# Patient Record
Sex: MALE | Race: BLACK OR AFRICAN AMERICAN | NOT HISPANIC OR LATINO | Employment: UNEMPLOYED | URBAN - METROPOLITAN AREA
[De-identification: names, ages, dates, MRNs, and addresses within clinical notes are randomized per-mention and may not be internally consistent; named-entity substitution may affect disease eponyms.]

---

## 2018-05-14 ENCOUNTER — HISTORICAL (OUTPATIENT)
Dept: ADMINISTRATIVE | Facility: HOSPITAL | Age: 33
End: 2018-05-14

## 2018-06-04 ENCOUNTER — HISTORICAL (OUTPATIENT)
Dept: ADMINISTRATIVE | Facility: HOSPITAL | Age: 33
End: 2018-06-04

## 2018-07-16 ENCOUNTER — HISTORICAL (OUTPATIENT)
Dept: ADMINISTRATIVE | Facility: HOSPITAL | Age: 33
End: 2018-07-16

## 2018-11-14 ENCOUNTER — HISTORICAL (OUTPATIENT)
Dept: ADMINISTRATIVE | Facility: HOSPITAL | Age: 33
End: 2018-11-14

## 2018-11-14 LAB
ABS NEUT (OLG): 2.56 X10(3)/MCL (ref 2.1–9.2)
ALBUMIN SERPL-MCNC: 4.1 GM/DL (ref 3.4–5)
ALBUMIN/GLOB SERPL: 1 RATIO (ref 1–2)
ALP SERPL-CCNC: 98 UNIT/L (ref 45–117)
ALT SERPL-CCNC: 26 UNIT/L (ref 12–78)
AST SERPL-CCNC: 23 UNIT/L (ref 15–37)
BASOPHILS # BLD AUTO: 0.08 X10(3)/MCL
BASOPHILS NFR BLD AUTO: 2 %
BILIRUB SERPL-MCNC: 0.5 MG/DL (ref 0.2–1)
BILIRUBIN DIRECT+TOT PNL SERPL-MCNC: 0.2 MG/DL
BILIRUBIN DIRECT+TOT PNL SERPL-MCNC: 0.3 MG/DL
BUN SERPL-MCNC: 9 MG/DL (ref 7–18)
CALCIUM SERPL-MCNC: 8.8 MG/DL (ref 8.5–10.1)
CHLORIDE SERPL-SCNC: 102 MMOL/L (ref 98–107)
CHOLEST SERPL-MCNC: 122 MG/DL
CHOLEST/HDLC SERPL: 1.8 {RATIO} (ref 0–5)
CO2 SERPL-SCNC: 33 MMOL/L (ref 21–32)
CREAT SERPL-MCNC: 0.9 MG/DL (ref 0.6–1.3)
EOSINOPHIL # BLD AUTO: 0.45 X10(3)/MCL
EOSINOPHIL NFR BLD AUTO: 8 %
ERYTHROCYTE [DISTWIDTH] IN BLOOD BY AUTOMATED COUNT: 11.8 % (ref 11.5–14.5)
EST. AVERAGE GLUCOSE BLD GHB EST-MCNC: 77 MG/DL
GLOBULIN SER-MCNC: 3.6 GM/ML (ref 2.3–3.5)
GLUCOSE SERPL-MCNC: 69 MG/DL (ref 74–106)
HBA1C MFR BLD: 4.3 % (ref 4.2–6.3)
HCT VFR BLD AUTO: 44.3 % (ref 40–51)
HDLC SERPL-MCNC: 68 MG/DL
HGB BLD-MCNC: 14.2 GM/DL (ref 13.5–17.5)
IMM GRANULOCYTES # BLD AUTO: 0.01 10*3/UL
IMM GRANULOCYTES NFR BLD AUTO: 0 %
LDLC SERPL CALC-MCNC: 40 MG/DL (ref 0–130)
LYMPHOCYTES # BLD AUTO: 1.86 X10(3)/MCL
LYMPHOCYTES NFR BLD AUTO: 34 % (ref 13–40)
MCH RBC QN AUTO: 30 PG (ref 26–34)
MCHC RBC AUTO-ENTMCNC: 32.1 GM/DL (ref 31–37)
MCV RBC AUTO: 93.5 FL (ref 80–100)
MONOCYTES # BLD AUTO: 0.52 X10(3)/MCL
MONOCYTES NFR BLD AUTO: 10 % (ref 4–12)
NEUTROPHILS # BLD AUTO: 2.56 X10(3)/MCL
NEUTROPHILS NFR BLD AUTO: 47 X10(3)/MCL
PLATELET # BLD AUTO: 110 X10(3)/MCL (ref 130–400)
PMV BLD AUTO: 12.5 FL (ref 7.4–10.4)
POTASSIUM SERPL-SCNC: 3.3 MMOL/L (ref 3.5–5.1)
PROT SERPL-MCNC: 7.7 GM/DL (ref 6.4–8.2)
RBC # BLD AUTO: 4.74 X10(6)/MCL (ref 4.5–5.9)
SODIUM SERPL-SCNC: 140 MMOL/L (ref 136–145)
TRIGL SERPL-MCNC: 69 MG/DL
TSH SERPL-ACNC: 2.9 MIU/L (ref 0.36–3.74)
VLDLC SERPL CALC-MCNC: 14 MG/DL
WBC # SPEC AUTO: 5.5 X10(3)/MCL (ref 4.5–11)

## 2018-11-19 ENCOUNTER — HISTORICAL (OUTPATIENT)
Dept: ADMINISTRATIVE | Facility: HOSPITAL | Age: 33
End: 2018-11-19

## 2018-12-04 ENCOUNTER — HISTORICAL (OUTPATIENT)
Dept: ADMINISTRATIVE | Facility: HOSPITAL | Age: 33
End: 2018-12-04

## 2018-12-18 ENCOUNTER — HISTORICAL (OUTPATIENT)
Dept: RADIOLOGY | Facility: HOSPITAL | Age: 33
End: 2018-12-18

## 2018-12-18 ENCOUNTER — HISTORICAL (OUTPATIENT)
Dept: ADMINISTRATIVE | Facility: HOSPITAL | Age: 33
End: 2018-12-18

## 2019-03-27 ENCOUNTER — HISTORICAL (OUTPATIENT)
Dept: ADMINISTRATIVE | Facility: HOSPITAL | Age: 34
End: 2019-03-27

## 2020-01-20 ENCOUNTER — HISTORICAL (OUTPATIENT)
Dept: ADMINISTRATIVE | Facility: HOSPITAL | Age: 35
End: 2020-01-20

## 2020-01-20 LAB
APTT PPP: 33.3 SECOND(S) (ref 23.3–37)
ERYTHROCYTE [DISTWIDTH] IN BLOOD BY AUTOMATED COUNT: 12.1 % (ref 11.5–14.5)
HCT VFR BLD AUTO: 48.1 % (ref 40–51)
HGB BLD-MCNC: 14.3 GM/DL (ref 13.5–17.5)
INR PPP: 0.94 (ref 0.9–1.2)
MCH RBC QN AUTO: 29.2 PG (ref 26–34)
MCHC RBC AUTO-ENTMCNC: 29.7 GM/DL (ref 31–37)
MCV RBC AUTO: 98.2 FL (ref 80–100)
PLATELET # BLD AUTO: 117 X10(3)/MCL (ref 130–400)
PMV BLD AUTO: 12.6 FL (ref 7.4–10.4)
PROTHROMBIN TIME: 12.5 SECOND(S) (ref 11.9–14.4)
RBC # BLD AUTO: 4.9 X10(6)/MCL (ref 4.5–5.9)
WBC # SPEC AUTO: 5.1 X10(3)/MCL (ref 4.5–11)

## 2020-01-21 ENCOUNTER — HISTORICAL (OUTPATIENT)
Dept: SURGERY | Facility: HOSPITAL | Age: 35
End: 2020-01-21

## 2021-05-25 ENCOUNTER — HISTORICAL (OUTPATIENT)
Dept: ADMINISTRATIVE | Facility: HOSPITAL | Age: 36
End: 2021-05-25

## 2021-05-25 LAB — SARS-COV-2 RNA RESP QL NAA+PROBE: NOT DETECTED

## 2021-05-27 ENCOUNTER — HISTORICAL (OUTPATIENT)
Dept: SURGERY | Facility: HOSPITAL | Age: 36
End: 2021-05-27

## 2022-04-30 NOTE — H&P
Patient:   Davis Thomas            MRN: [id number]            FIN: [id number]               Age:   34 years     Sex:  Male     :  1985   Associated Diagnoses:   None   Author:   Sandy Roman MD      H&P      HPI: PT here for PKP OS. Denies changes in health or medications since the patient was last seen in clinic.     ROS: No chest pain, no trouble breathing.     General NAD  HENT atraumatic  Eyes: See eye note   Neck: no limitation  Respiratory: no distress on room air  Cardiovascular: warm and well perfused   Musculoskeletal: ambulates well     Assessment/Plan  1. Keratoconus OS   - After extensive discussion of R/B/A, informed consent was signed in clinic and reviewed today  - Plan for PKP OS today

## 2022-04-30 NOTE — OP NOTE
Patient:   Davis Thomas            MRN: [id number]            FIN: [id number]               Age:   36 years     Sex:  Male     :  1985   Associated Diagnoses:   None   Author:   Bernabe Blanco MD        Patient: ROBEL Thomas  Primary Surgeon: BRENDEN Blanco  Assistant: AVRIL Hernandez  Preop Dx:  1. corneal graft scarring with astigmatism  Postop Dx: same  Procedure: anterior segment revision with sarina blade keratotomy  Complications: none  EBL: none  Anesthesia: topical / mac  Detail:    After informed consent was obtained, all risks, benefits and alternatives were explained in full detail.  The patient  was brought into the operative suite and placed supine on the operative table.  Attention was turned to the left eye  where it was prepped and draped in sterile ophthalmic fashion and a speculum was placed.    The sarina blade was brought onto the field and dialed to 700 micron depth.  The 90 degree axis was identified  and a 50 degree paired incision at 12:00 and 6:00 was created.  The wound created was examined and found  to be approximately 90% depth just inside the original graft host junction without signs of micro or macro perforation.    The patient tolerated the procedure well without complications and was taken to recovery in stable condition.    I performed the entire procedure personally.    CPT: 97604

## 2022-05-04 NOTE — HISTORICAL [facility] CERNER
This is a historical note converted from Tai. Formatting and pictures may have been removed.  Please reference Tai for original formatting and attached multimedia. HPI: PT here for?ASTIGMATIC KERATOTOMY?OS. Denies changes in health or medications since the patient was last seen in clinic.  ?  ROS: Negative x 10  ?  ?   General NAP  HENT atraumatic  Eyes:?S/P DALK?OS  Neck: nontender, no masses or thyromegaly  Respiratory: no distress on room air  Cardiovascular: regular rate  Gastrointestinal: no hepatomegaly?  Lymphatics: no lymphadenopathy  Musculoskeletal: wnl  ?  ?   Assessment/Plan  1. ASTIGMATISM LEFT EYE  - S/P DALK STILL WITH SIGNIFICANT ASTIGMATISM  - After extensive discussion of R/B/A, informed consent was?already?signed in clinic.  - All questions were answered.  - Proceed to OR for AK OS TODAY  ?

## 2022-06-23 ENCOUNTER — OFFICE VISIT (OUTPATIENT)
Dept: OPHTHALMOLOGY | Facility: CLINIC | Age: 37
End: 2022-06-23
Payer: MEDICAID

## 2022-06-23 VITALS — BODY MASS INDEX: 20.09 KG/M2 | HEIGHT: 74 IN | WEIGHT: 156.5 LBS

## 2022-06-23 DIAGNOSIS — Z94.7 STATUS POST CORNEAL TRANSPLANT: Primary | ICD-10-CM

## 2022-06-23 PROCEDURE — 99213 OFFICE O/P EST LOW 20 MIN: CPT | Mod: PBBFAC,PO | Performed by: STUDENT IN AN ORGANIZED HEALTH CARE EDUCATION/TRAINING PROGRAM

## 2022-06-23 RX ORDER — PREDNISOLONE ACETATE 10 MG/ML
SUSPENSION/ DROPS OPHTHALMIC
COMMUNITY
Start: 2022-03-23 | End: 2022-06-23

## 2022-06-23 RX ORDER — PREDNISOLONE ACETATE 10 MG/ML
1 SUSPENSION/ DROPS OPHTHALMIC DAILY
Qty: 10 ML | Refills: 0 | Status: SHIPPED | OUTPATIENT
Start: 2022-06-23 | End: 2022-10-21 | Stop reason: SDUPTHER

## 2022-06-23 NOTE — PROGRESS NOTES
Prior studies:  Mrx 12/23/21:  OD: -3.00 +5.00 x 060 2/50  OS: -2.75 +2.25 x 180 20/100    Gabriel 12/23/21  OD: Flat sinK 39.07, steep sim K 43.18, astig 4.12 D at 49  OS: Flat simK 45.78, steep simK 46.51, astig 0.73 x 137  ______________________________________________________    OCT RNFL (6/23/22):  OD: 99 green STI, white N  OS: 102 green 360  No thinning OU    HVF (6/23/22):  OD: 0/11 FL, 0% FP, 4% FN, scattered superior and inferior defects  OS: 0/12 FL, 5% FP, 16% FN, superotemporal defect  Nonglaucomatous field changes    K gabriel (6/23/22):  OD: Steep K: 45.27D, Flat K 40.36D, astig 4.91D @ 68  OS: Steep K: 48.73D, Steep K 45.21D, astig 3.52 @110    Assessment/Plan:  1. Keratoconus OS   - s/p DALK OS 1/2020  - s/p AK for astigmatism 5/21  - continue PF daily  - Wear protective glasses during the day at all times  - Worsening astig, discussed that pt would likely not benefit from repeat AK at this point. Patient has thick cornea and would likely benefit from PRK however unable to afford as noted below  - Will give Mrx today    2. Keratoconus OD  - S/p PKP (11/6/14)  - S/p AK 10/7/19  - Cornea has clear central PKP graft  - continue PF once weekly; drops refilled  - Patient indicated that there were barriers to using contact lenses, including affordability and difficulty placing them  - Per Dr. Blanco can consider PRK to reduce astigmatism. Spoke with Dr. Alonso's office, cost would be $250 for standard. Pt unable to afford at this time, Mrx given tdoay    3. Open angle with borderline findings, OU  - (+) Fhx  - Gonio 10/2018: open to  OU, light pigmentation  - Large nerves, 0.6  - Baseline HVF 10/2018 unreliable, likely due to irregular astigmatism (will follow with OCT)  - OCT RNFL all green  - HVF without glaucaomatous changes  - IOP wnl    4. MGD OU  - Start WC/LS/ATs 4-6 times/day (handout given)    5. Marfan's Syndrome  - seen by PCP with workup initiated  - echo done  - no obvious lens subluxation  on exam    DW Dr. Blanco

## 2022-10-21 DIAGNOSIS — Z94.7 STATUS POST CORNEAL TRANSPLANT: ICD-10-CM

## 2022-10-21 RX ORDER — PREDNISOLONE ACETATE 10 MG/ML
1 SUSPENSION/ DROPS OPHTHALMIC DAILY
Qty: 10 ML | Refills: 3 | Status: SHIPPED | OUTPATIENT
Start: 2022-10-21 | End: 2023-10-21

## 2022-10-21 NOTE — TELEPHONE ENCOUNTER
----- Message from Sabrina Connelly sent at 10/21/2022 12:16 PM CDT -----  Regarding: Needs refill  Contact: [phone]  Patient needs a refill for his eye drops.    [facility] - [facility]      TYVM :)

## 2022-12-08 ENCOUNTER — OFFICE VISIT (OUTPATIENT)
Dept: OPHTHALMOLOGY | Facility: CLINIC | Age: 37
End: 2022-12-08
Payer: MEDICAID

## 2022-12-08 VITALS — HEIGHT: 74 IN | WEIGHT: 156.5 LBS | BODY MASS INDEX: 20.09 KG/M2

## 2022-12-08 DIAGNOSIS — Z94.7 STATUS POST CORNEAL TRANSPLANT: Primary | ICD-10-CM

## 2022-12-08 PROCEDURE — 92025 CPTRIZED CORNEAL TOPOGRAPHY: CPT | Mod: PBBFAC,PO | Performed by: STUDENT IN AN ORGANIZED HEALTH CARE EDUCATION/TRAINING PROGRAM

## 2022-12-08 PROCEDURE — 99212 OFFICE O/P EST SF 10 MIN: CPT | Mod: PBBFAC,PO | Performed by: STUDENT IN AN ORGANIZED HEALTH CARE EDUCATION/TRAINING PROGRAM

## 2022-12-08 RX ORDER — OMEPRAZOLE 40 MG/1
40 CAPSULE, DELAYED RELEASE ORAL
COMMUNITY
Start: 2021-09-27

## 2022-12-08 NOTE — PROGRESS NOTES
Prior studies:  Mrx 12/23/21:  OD: -3.00 +5.00 x 060 2/50  OS: -2.75 +2.25 x 180 20/100    Gabriel 12/23/21  OD: Flat sinK 39.07, steep sim K 43.18, astig 4.12 D at 49  OS: Flat simK 45.78, steep simK 46.51, astig 0.73 x 137  ______________________________________________________    OCT RNFL (6/23/22):  OD: 99 green STI, white N  OS: 102 green 360  No thinning OU    HVF (6/23/22):  OD: 0/11 FL, 0% FP, 4% FN, scattered superior and inferior defects  OS: 0/12 FL, 5% FP, 16% FN, superotemporal defect  Nonglaucomatous field changes    K gabriel (6/23/22):  OD: Steep K: 45.27D, Flat K 40.36D, astig 4.91D @ 68  OS: Steep K: 48.73D, Steep K 45.21D, astig 3.52 @110    K gabriel 12/8/22   OD: 45.33//40.35 5.01 at 63   OS: 48.39//44.43 3.96 at 103     Assessment/Plan:  1. Keratoconus OS   - s/p DALK OS 1/2020  - s/p AK for astigmatism 5/21  - Wear protective glasses during the day at all times  - Worsening astig, discussed that pt would likely not benefit from repeat AK at this point. Patient has thick cornea and would likely benefit from PRK however unable to afford as noted below  - Mrx given 12/2022  - Decrease PF to qweek     2. Keratoconus OD  - S/p PKP (11/6/14)  - S/p AK 10/7/19  - Cornea has clear central PKP graft  - continue PF once weekly; drops refilled  - Patient indicated that there were barriers to using contact lenses, including affordability and difficulty placing them  - Per Dr. Blanco can consider PRK to reduce astigmatism. Spoke with Dr. Alonso's office, cost would be $250 for standard. Pt unable to afford at this time, Mrx given 6/2022    3. Open angle with borderline findings, OU  - (+) Fhx  - Gonio 10/2018: open to  OU, light pigmentation  - Large nerves, 0.6  - Baseline HVF 10/2018 unreliable, likely due to irregular astigmatism (will follow with OCT)  - OCT RNFL all green  - HVF without glaucaomatous changes  - IOP wnl    4. MGD OU  - Start WC/LS/ATs 4-6 times/day (handout given)    5. Marfan's  Syndrome  - seen by PCP with workup initiated  - echo done  - no obvious lens subluxation on exam        RTC 1 year Francis Rios

## 2023-09-07 ENCOUNTER — OFFICE VISIT (OUTPATIENT)
Dept: INTERNAL MEDICINE | Facility: CLINIC | Age: 38
End: 2023-09-07
Payer: MEDICAID

## 2023-09-07 VITALS
RESPIRATION RATE: 18 BRPM | HEART RATE: 71 BPM | DIASTOLIC BLOOD PRESSURE: 83 MMHG | WEIGHT: 151.19 LBS | TEMPERATURE: 98 F | SYSTOLIC BLOOD PRESSURE: 115 MMHG | BODY MASS INDEX: 19.4 KG/M2 | HEIGHT: 74 IN

## 2023-09-07 DIAGNOSIS — Z13.1 SCREENING FOR DIABETES MELLITUS: ICD-10-CM

## 2023-09-07 DIAGNOSIS — Z76.89 ENCOUNTER TO ESTABLISH CARE: ICD-10-CM

## 2023-09-07 DIAGNOSIS — Z00.00 WELLNESS EXAMINATION: Primary | ICD-10-CM

## 2023-09-07 DIAGNOSIS — Z13.29 SCREENING FOR THYROID DISORDER: ICD-10-CM

## 2023-09-07 DIAGNOSIS — Z13.220 SCREENING CHOLESTEROL LEVEL: ICD-10-CM

## 2023-09-07 DIAGNOSIS — Z94.7 STATUS POST CORNEAL TRANSPLANT: Chronic | ICD-10-CM

## 2023-09-07 DIAGNOSIS — Z11.3 ROUTINE SCREENING FOR STI (SEXUALLY TRANSMITTED INFECTION): ICD-10-CM

## 2023-09-07 PROBLEM — Q87.40 MARFAN'S SYNDROME: Chronic | Status: ACTIVE | Noted: 2023-09-07

## 2023-09-07 PROBLEM — Q87.40 MARFAN'S SYNDROME: Status: ACTIVE | Noted: 2023-09-07

## 2023-09-07 PROCEDURE — 1159F PR MEDICATION LIST DOCUMENTED IN MEDICAL RECORD: ICD-10-PCS | Mod: CPTII,,, | Performed by: NURSE PRACTITIONER

## 2023-09-07 PROCEDURE — 3074F PR MOST RECENT SYSTOLIC BLOOD PRESSURE < 130 MM HG: ICD-10-PCS | Mod: CPTII,,, | Performed by: NURSE PRACTITIONER

## 2023-09-07 PROCEDURE — 3079F PR MOST RECENT DIASTOLIC BLOOD PRESSURE 80-89 MM HG: ICD-10-PCS | Mod: CPTII,,, | Performed by: NURSE PRACTITIONER

## 2023-09-07 PROCEDURE — 1159F MED LIST DOCD IN RCRD: CPT | Mod: CPTII,,, | Performed by: NURSE PRACTITIONER

## 2023-09-07 PROCEDURE — 3079F DIAST BP 80-89 MM HG: CPT | Mod: CPTII,,, | Performed by: NURSE PRACTITIONER

## 2023-09-07 PROCEDURE — 3008F PR BODY MASS INDEX (BMI) DOCUMENTED: ICD-10-PCS | Mod: CPTII,,, | Performed by: NURSE PRACTITIONER

## 2023-09-07 PROCEDURE — 99214 OFFICE O/P EST MOD 30 MIN: CPT | Mod: PBBFAC | Performed by: NURSE PRACTITIONER

## 2023-09-07 PROCEDURE — 99213 PR OFFICE/OUTPT VISIT, EST, LEVL III, 20-29 MIN: ICD-10-PCS | Mod: S$PBB,,, | Performed by: NURSE PRACTITIONER

## 2023-09-07 PROCEDURE — 1160F RVW MEDS BY RX/DR IN RCRD: CPT | Mod: CPTII,,, | Performed by: NURSE PRACTITIONER

## 2023-09-07 PROCEDURE — 3008F BODY MASS INDEX DOCD: CPT | Mod: CPTII,,, | Performed by: NURSE PRACTITIONER

## 2023-09-07 PROCEDURE — 99213 OFFICE O/P EST LOW 20 MIN: CPT | Mod: S$PBB,,, | Performed by: NURSE PRACTITIONER

## 2023-09-07 PROCEDURE — 1160F PR REVIEW ALL MEDS BY PRESCRIBER/CLIN PHARMACIST DOCUMENTED: ICD-10-PCS | Mod: CPTII,,, | Performed by: NURSE PRACTITIONER

## 2023-09-07 PROCEDURE — 3074F SYST BP LT 130 MM HG: CPT | Mod: CPTII,,, | Performed by: NURSE PRACTITIONER

## 2023-09-07 NOTE — PROGRESS NOTES
Jena Bah, JACQUELINE   [facility]  [address] [facility] [address]      PATIENT NAME: Davis Thomas  : 1985  DATE: 23  MRN: [id number]      Reason for Visit / Chief Complaint: Establish Care (Not fasting, refused vaccine )       History of Present Illness / Problem Focused Workflow     Davis Thomas is a 38 y.o. Black or  male presents to the clinic to establish primary care. PMH Marfan's syndrome, glaucoma, s/p bilateral corneal transplants. Followed by [facility] optho clinic.     Today, pt presents to establish primary care. Has never had PCP. States has never seen cardio. Reports eats constantly and denies any GI or cardiac problems. Is not fasting today. Declines vaccines today. Denies chest pain, shortness of breath, cough, fever, headache, dizziness, weakness, abdominal pain, nausea, vomiting, diarrhea, constipation, dysuria, depression, anxiety, SI/HI.    Review of Systems     Review of Systems     See HPI for details    Constitutional: Denies Change in appetite. Denies Chills. Denies Fever. Denies Night sweats.  Eye: Denies Blurred vision. Denies Discharge. Denies Eye pain.  ENT: Denies Decreased hearing. Denies Sore throat. Denies Swollen glands.  Respiratory: Denies Cough. Denies Shortness of breath. Denies Shortness of breath with exertion. Denies Wheezing.  Cardiovascular: Denies Chest pain at rest. Denies Chest pain with exertion. Denies Irregular heartbeat. Denies Palpitations. Denies Edema.  Gastrointestinal: Denies Abdominal pain. Denies Diarrhea. Denies Nausea. Denies Vomiting. Denies Hematemesis or Hematochezia.  Genitourinary: Denies Dysuria. Denies Urinary frequency. Denies Urinary urgency. Denies Blood in urine.  Endocrine: Denies Cold intolerance. Denies Excessive thirst. Denies Heat intolerance. Denies Weight loss. Denies Weight gain.  Musculoskeletal: Denies Painful joints. Denies  "Weakness.  Integumentary: Denies Rash. Denies Itching. Denies Dry skin.  Neurologic: Denies Dizziness. Denies Fainting. Denies Headache.  Psychiatric: Denies Depression. Denies Anxiety. Denies Suicidal/Homicidal ideations.    All Other ROS: Negative except as stated in HPI.     Medical / Surgical / Social / Family History       ----------------------------  Keratoconus     Past Surgical History:   Procedure Laterality Date    CORNEAL TRANSPLANT Bilateral        Social History     Socioeconomic History    Marital status: Unknown   Tobacco Use    Smoking status: Never     Passive exposure: Never    Smokeless tobacco: Never   Substance and Sexual Activity    Alcohol use: Yes     Comment: beer on occ    Drug use: Not Currently     Types: Marijuana     Social Determinants of Health     Transportation Needs: No Transportation Needs (9/7/2023)    PRAPARE - Transportation     Lack of Transportation (Medical): No     Lack of Transportation (Non-Medical): No   Housing Stability: Low Risk  (9/7/2023)    Housing Stability Vital Sign     Unable to Pay for Housing in the Last Year: No     Number of Places Lived in the Last Year: 1     Unstable Housing in the Last Year: No        History reviewed. No pertinent family history.     Medications and Allergies     Medications  Current Outpatient Medications   Medication Instructions    omeprazole (PRILOSEC) 40 mg, Oral    prednisoLONE acetate (PRED FORTE) 1 % DrpS 1 drop, Left Eye, Daily         Allergies  Review of patient's allergies indicates:  No Known Allergies    Physical Examination     /83 (BP Location: Right arm, Patient Position: Sitting, BP Method: Medium (Automatic))   Pulse 71   Temp 98.4 °F (36.9 °C) (Oral)   Resp 18   Ht 6' 2.02" (1.88 m)   Wt 68.6 kg (151 lb 3.2 oz)   BMI 19.40 kg/m²     Physical Exam  Constitutional:       Comments: Thin, with long arms, fingers and legs         General: Alert and oriented, No acute distress.  Head: Normocephalic, " Atraumatic.  Eye: Pupils are equal, round and reactive to light, Extraocular movements are intact, Sclera non-icteric.  Ears/Nose/Throat: Normal, Mucosa moist,Clear.  Neck/Thyroid: Supple, Non-tender, No carotid bruit, No lymphadenopathy, No JVD, Full range of motion.  Respiratory: Clear to auscultation bilaterally; No wheezes, rales or rhonchi,Non-labored respirations, Symmetrical chest wall expansion.  Cardiovascular: Regular rate and rhythm, S1/S2 normal, No murmurs, rubs or gallops.  Gastrointestinal: Soft, Non-tender, Non-distended, Normal bowel sounds, No palpable organomegaly.  Musculoskeletal: Normal range of motion.  Integumentary: Warm, Dry, Intact, No suspicious lesions or rashes.  Extremities: No clubbing, cyanosis or edema  Neurologic: No focal deficits, Cranial Nerves II-XII are grossly intact, Motor strength normal upper and lower extremities, Sensory exam intact.  Psychiatric: Normal interaction, Coherent speech, Appropriate affect       Results     Lab Results   Component Value Date    WBC 5.9 09/27/2021    HGB 15.8 09/27/2021    HCT 48.1 09/27/2021     09/27/2021    CHOL 122 11/14/2018    TRIG 69 11/14/2018    ALT 13 09/27/2021    AST 19 09/27/2021     09/27/2021    K 3.3 (L) 09/27/2021    CREATININE 0.84 09/27/2021    BUN 4.5 (L) 09/27/2021    CO2 26 09/27/2021    TSH 2.900 11/14/2018    INR 0.94 01/20/2020    HGBA1C 4.3 11/14/2018         Assessment and Plan (including Health Maintenance)     Plan:     1. Encounter to establish care  Labs ordered; pt is not fasting.    2. Status post corneal transplant  Keep optho appts as scheduled.  Report vision changes immediately.    3. Routine screening for STI (sexually transmitted infection)  - Hepatitis Panel, Acute; Future  - Chlamydia/GC, PCR  - HIV 1/2 Ag/Ab (4th Gen); Future  - SYPHILIS ANTIBODY (WITH REFLEX RPR); Future      Problem List Items Addressed This Visit          Ophtho    Status post corneal transplant (Chronic)       Other     Encounter to establish care     Other Visit Diagnoses       Wellness examination    -  Primary    Relevant Orders    CBC Auto Differential    Comprehensive Metabolic Panel    Urinalysis, Reflex to Urine Culture    Screening for diabetes mellitus        Relevant Orders    Hemoglobin A1C    Screening cholesterol level        Relevant Orders    Lipid Panel    Screening for thyroid disorder        Relevant Orders    TSH    Routine screening for STI (sexually transmitted infection)        Relevant Orders    Hepatitis Panel, Acute    Chlamydia/GC, PCR    HIV 1/2 Ag/Ab (4th Gen)    SYPHILIS ANTIBODY (WITH REFLEX RPR)              Health Maintenance Due   Topic Date Due    Hepatitis C Screening  Never done    COVID-19 Vaccine (1) Never done    HIV Screening  Never done    TETANUS VACCINE  Never done    Influenza Vaccine (1) Never done       Follow up in about 4 weeks (around 10/5/2023) for Wellness, Lab Results.        Signature:  JACQUELINE Kaplan  [facility]  [address] [facility] [address]

## 2023-11-02 RX ORDER — PREDNISOLONE ACETATE 10 MG/ML
1 SUSPENSION/ DROPS OPHTHALMIC WEEKLY
COMMUNITY
End: 2023-11-02 | Stop reason: SDUPTHER

## 2023-11-02 RX ORDER — PREDNISOLONE ACETATE 10 MG/ML
1 SUSPENSION/ DROPS OPHTHALMIC WEEKLY
Qty: 5 ML | Refills: 5 | Status: SHIPPED | OUTPATIENT
Start: 2023-11-02 | End: 2023-12-07 | Stop reason: SDUPTHER

## 2023-11-02 NOTE — TELEPHONE ENCOUNTER
----- Message from Teresa Adler sent at 11/2/2023  8:22 AM CDT -----  Regarding: refill  Contact: Belinda Joyner mom called to request a refill on white top. Confirm contact and pharmacy on file.    Thanks

## 2023-11-22 LAB
C TRACH DNA SPEC QL NAA+PROBE: NOT DETECTED
N GONORRHOEA DNA SPEC QL NAA+PROBE: NOT DETECTED
SOURCE (OHS): NORMAL

## 2023-12-07 ENCOUNTER — OFFICE VISIT (OUTPATIENT)
Dept: OPHTHALMOLOGY | Facility: CLINIC | Age: 38
End: 2023-12-07
Payer: MEDICAID

## 2023-12-07 VITALS — BODY MASS INDEX: 19.41 KG/M2 | WEIGHT: 151.25 LBS | HEIGHT: 74 IN

## 2023-12-07 DIAGNOSIS — Z94.7 STATUS POST CORNEAL TRANSPLANT: Primary | ICD-10-CM

## 2023-12-07 DIAGNOSIS — H18.603 KERATOCONUS OF BOTH EYES: ICD-10-CM

## 2023-12-07 PROCEDURE — 92025 CPTRIZED CORNEAL TOPOGRAPHY: CPT | Mod: PBBFAC,PN | Performed by: OPHTHALMOLOGY

## 2023-12-07 PROCEDURE — 99212 OFFICE O/P EST SF 10 MIN: CPT | Mod: PBBFAC,PN

## 2023-12-07 PROCEDURE — 92025 CPTRIZED CORNEAL TOPOGRAPHY: CPT | Mod: PBBFAC,PN

## 2023-12-07 RX ORDER — PREDNISOLONE ACETATE 10 MG/ML
1 SUSPENSION/ DROPS OPHTHALMIC WEEKLY
Qty: 20 ML | Refills: 5 | Status: SHIPPED | OUTPATIENT
Start: 2023-12-07

## 2023-12-07 NOTE — PROGRESS NOTES
"HPI     Keratoconus     Additional comments: Gabriel  S/p PKP OD (11/6/14)  S/p AK OD 10/7/19   s/p DALK OS 1/2020  s/p AK OS for astigmatism 5/21      Blurred Vision     Additional comments: Patient states that vision " has a lil whack to it".   He is trying to find a place that accepts his insurance so that       Medication Refill     Additional comments: Using PF OU qd right know. Pharmacy in chart   [facility]      Comments    Keratoconus, blurry vision, med refill     OCT RNFL (6/23/22):  OD: 99 green STI, white N  OS: 102 green 360  No thinning OU    HVF (6/23/22):  OD: 0/11 FL, 0% FP, 4% FN, scattered superior and inferior defects  OS: 0/12 FL, 5% FP, 16% FN, superotemporal defect  Nonglaucomatous field changes    Gabriel 12/23/21  OD: Flat sinK 39.07, steep sim K 43.18, astig 4.12 D at 49  OS: Flat simK 45.78, steep simK 46.51, astig 0.73 x 137    6/23/22  OD: Steep K: 45.27D, Flat K 40.36D, astig 4.91D @ 68  OS: Steep K: 48.73D, Steep K 45.21D, astig 3.52 @110    12/8/22   OD: 45.33//40.35 5.01 at 63   OS: 48.39//44.43 3.96 at 103     12/7/23  OD: 45.36//40.61 astig 4.76D at 63  OS: 49.5//44.39 astig 5.11D at 103    Assessment/Plan:  1. Keratoconus OS   - s/p DALK OS 1/2020  - s/p AK for astigmatism 5/21  - Wear protective glasses during the day at all times  - Worsening astig, discussed that pt would likely not benefit from repeat AK at this point. Patient has thick cornea and would likely benefit from PRK however unable to afford as noted below  - Mrx given 12/2023, patient never got glasses due to unable to afford them  - 12/7/23: using PF daily still, decrease to once weekly, drops refilled; states vision is getting worse. Topography showed worsening astigmatism. Discussed with patient hard contact lens vs PRK, patient unable to afford now but is waiting for disability to go through in 2 months. RTC in 3-4 months for Dr. Blanco     2. Keratoconus OD  - S/p PKP (11/6/14)  - S/p AK 10/7/19  - Cornea has clear " central PKP graft  - continue PF once weekly; drops refilled  - Patient indicated that there were barriers to using contact lenses, including affordability and difficulty placing them  - Per Dr. Blanco can consider PRK to reduce astigmatism. Spoke with Dr. Alonso's office, cost would be $250 for standard.    3. Open angle with borderline findings, OU  - (+) Fhx  - Gonio 10/2018: open to  OU, light pigmentation  - Large nerves, 0.6  - Baseline HVF 10/2018 unreliable, likely due to irregular astigmatism (will follow with OCT)  - OCT RNFL all green  - HVF without glaucaomatous changes  - IOP wnl    4. MGD OU  - Start WC/LS/ATs 4-6 times/day (handout given)    5. Marfan's Syndrome  - seen by PCP with workup initiated  - echo done  - no obvious lens subluxation on exam    RTC 3-4 months Dr. Blanco

## 2024-03-14 ENCOUNTER — OFFICE VISIT (OUTPATIENT)
Dept: OPHTHALMOLOGY | Facility: CLINIC | Age: 39
End: 2024-03-14
Payer: MEDICAID

## 2024-03-14 VITALS — BODY MASS INDEX: 19.41 KG/M2 | WEIGHT: 151.25 LBS | HEIGHT: 74 IN

## 2024-03-14 DIAGNOSIS — H18.603 KERATOCONUS OF BOTH EYES: Primary | ICD-10-CM

## 2024-03-14 DIAGNOSIS — Z94.7 STATUS POST CORNEAL TRANSPLANT: ICD-10-CM

## 2024-03-14 PROCEDURE — 99212 OFFICE O/P EST SF 10 MIN: CPT | Mod: PBBFAC,PN | Performed by: STUDENT IN AN ORGANIZED HEALTH CARE EDUCATION/TRAINING PROGRAM

## 2024-03-14 NOTE — PROGRESS NOTES
HPI     Keratoconus     Additional comments: K check    s/p DALK OS 1/2020, s/p AK OS for astigmatism 5/21, S/p PKP OD (11/6/14),   and S/p AK OD 10/7/19                Open angle with borderline findings     Additional comments: IOP check            Comments    Keratoconus, Open angle with borderline findings          Last edited by Venecia Pak MA on 3/14/2024  2:19 PM.            Assessment /Plan     For exam results, see Encounter Report.    Keratoconus of both eyes    Status post corneal transplant               Gabriel 12/23/21  OD: Flat sinK 39.07, steep sim K 43.18, astig 4.12 D at 49  OS: Flat simK 45.78, steep simK 46.51, astig 0.73 x 137    6/23/22  OD: Steep K: 45.27D, Flat K 40.36D, astig 4.91D @ 68  OS: Steep K: 48.73D, Steep K 45.21D, astig 3.52 @110    12/8/22   OD: 45.33//40.35 5.01 at 63   OS: 48.39//44.43 3.96 at 103     12/7/23  OD: 45.36//40.61 astig 4.76D at 63  OS: 49.5//44.39 astig 5.11D at 103    Assessment/Plan:      1. Keratoconus OS   - s/p DALK OS 1/2020  - s/p AK for astigmatism 5/21  - Wear protective glasses during the day at all times  - Worsening astig, discussed that pt would likely not benefit from repeat AK at this point. Patient has thick cornea and would likely benefit from PRK however unable to afford as noted below  - Mrx given 12/2023, patient never got glasses due to unable to afford them  - 12/7/23: using PF daily still, decrease to once weekly, drops refilled; states vision is getting worse. Topography showed worsening astigmatism. Discussed with patient hard contact lens vs PRK, patient unable to afford now but is waiting for disability (still).  RTC PRN for PRK logistics if/when interested    2. Keratoconus OD  - S/p PKP (11/6/14)  - S/p AK 10/7/19  - Cornea has clear central PKP graft  - continue PF once weekly; drops refilled  - Patient indicated that there were barriers to using contact lenses, including affordability and difficulty placing them  - Per Dr. Blanco  can consider PRK to reduce astigmatism. Spoke with Dr. Alonso's office, cost would be $250 for standard.  - 3/14/24: no change on exam, grafts look great.  Can continue once weekly PF and be seen when he is ready for PRK    3. Open angle with borderline findings, OU  - (+) Fhx  - Gonio 10/2018: open to  OU, light pigmentation  - Large nerves, 0.6  - Baseline HVF 10/2018 unreliable, likely due to irregular astigmatism (will follow with OCT)  - OCT RNFL all green  - HVF without glaucaomatous changes  - IOP wnl    4. MGD OU  - Start WC/LS/ATs 4-6 times/day (handout given)    5. Marfan's Syndrome  - seen by PCP with workup initiated  - echo done  - no obvious lens subluxation on exam    RTC 1 year sooner PRN

## 2024-03-23 ENCOUNTER — HOSPITAL ENCOUNTER (EMERGENCY)
Facility: HOSPITAL | Age: 39
Discharge: HOME | End: 2024-03-24
Attending: SPECIALIST
Payer: COMMERCIAL

## 2024-03-23 VITALS
DIASTOLIC BLOOD PRESSURE: 92 MMHG | TEMPERATURE: 97 F | SYSTOLIC BLOOD PRESSURE: 126 MMHG | RESPIRATION RATE: 20 BRPM | HEART RATE: 73 BPM | OXYGEN SATURATION: 98 %

## 2024-03-23 DIAGNOSIS — S90.111A CONTUSION OF RIGHT GREAT TOE WITHOUT DAMAGE TO NAIL, INITIAL ENCOUNTER: Primary | ICD-10-CM

## 2024-03-23 DIAGNOSIS — Z00.8 MEDICAL CLEARANCE FOR INCARCERATION: ICD-10-CM

## 2024-03-23 PROCEDURE — 99282 EMERGENCY DEPT VISIT SF MDM: CPT
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
## 2024-04-10 PROCEDURE — 96374 THER/PROPH/DIAG INJ IV PUSH: CPT

## 2024-04-10 PROCEDURE — 99285 EMERGENCY DEPT VISIT HI MDM: CPT | Mod: 25

## 2024-04-11 ENCOUNTER — HOSPITAL ENCOUNTER (INPATIENT)
Facility: HOSPITAL | Age: 39
LOS: 1 days | Discharge: HOME | DRG: 117 | End: 2024-04-11
Attending: EMERGENCY MEDICINE | Admitting: SURGERY
Payer: MEDICAID

## 2024-04-11 ENCOUNTER — ANESTHESIA (OUTPATIENT)
Dept: SURGERY | Facility: HOSPITAL | Age: 39
DRG: 117 | End: 2024-04-11
Payer: MEDICAID

## 2024-04-11 ENCOUNTER — ANESTHESIA EVENT (OUTPATIENT)
Dept: SURGERY | Facility: HOSPITAL | Age: 39
DRG: 117 | End: 2024-04-11
Payer: MEDICAID

## 2024-04-11 VITALS
SYSTOLIC BLOOD PRESSURE: 121 MMHG | HEART RATE: 91 BPM | DIASTOLIC BLOOD PRESSURE: 64 MMHG | WEIGHT: 160 LBS | HEIGHT: 70 IN | BODY MASS INDEX: 22.9 KG/M2 | RESPIRATION RATE: 17 BRPM | OXYGEN SATURATION: 96 % | TEMPERATURE: 99 F

## 2024-04-11 DIAGNOSIS — S02.2XXA CLOSED FRACTURE OF NASAL BONE, INITIAL ENCOUNTER: ICD-10-CM

## 2024-04-11 DIAGNOSIS — S05.32XA RUPTURE OF GLOBE OF EYE FOLLOWING BLUNT TRAUMA, LEFT, INITIAL ENCOUNTER: Primary | ICD-10-CM
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
## 2024-04-11 DIAGNOSIS — T14.90XA TRAUMA: ICD-10-CM

## 2024-04-11 PROBLEM — R07.9 CHEST PAIN: Status: ACTIVE | Noted: 2024-04-11

## 2024-04-11 PROBLEM — S05.30XA RUPTURE OF GLOBE: Status: ACTIVE | Noted: 2024-04-11

## 2024-04-11 LAB
ABORH RETYPE: NORMAL
ALBUMIN SERPL-MCNC: 4.3 G/DL (ref 3.5–5)
ALBUMIN/GLOB SERPL: 1.5 RATIO (ref 1.1–2)
ALP SERPL-CCNC: 90 UNIT/L (ref 40–150)
ALT SERPL-CCNC: 20 UNIT/L (ref 0–55)
APTT PPP: 30.2 SECONDS (ref 23.2–33.7)
AST SERPL-CCNC: 25 UNIT/L (ref 5–34)
BASOPHILS # BLD AUTO: 0.05 X10(3)/MCL
BASOPHILS NFR BLD AUTO: 0.5 %
BILIRUB SERPL-MCNC: 0.6 MG/DL
BUN SERPL-MCNC: 6.9 MG/DL (ref 8.9–20.6)
CALCIUM SERPL-MCNC: 8.8 MG/DL (ref 8.4–10.2)
CHLORIDE SERPL-SCNC: 105 MMOL/L (ref 98–107)
CO2 SERPL-SCNC: 19 MMOL/L (ref 22–29)
CREAT SERPL-MCNC: 0.92 MG/DL (ref 0.73–1.18)
CRP SERPL-MCNC: 7.3 MG/L
EOSINOPHIL # BLD AUTO: 0.02 X10(3)/MCL (ref 0–0.9)
EOSINOPHIL NFR BLD AUTO: 0.2 %
ERYTHROCYTE [DISTWIDTH] IN BLOOD BY AUTOMATED COUNT: 11.7 % (ref 11.5–17)
ETHANOL SERPL-MCNC: 200 MG/DL
GFR SERPLBLD CREATININE-BSD FMLA CKD-EPI: >60 MLS/MIN/1.73/M2
GLOBULIN SER-MCNC: 2.9 GM/DL (ref 2.4–3.5)
GLUCOSE SERPL-MCNC: 109 MG/DL (ref 74–100)
GROUP & RH: NORMAL
HCT VFR BLD AUTO: 42.7 % (ref 42–52)
HGB BLD-MCNC: 14 G/DL (ref 14–18)
IMM GRANULOCYTES # BLD AUTO: 0.04 X10(3)/MCL (ref 0–0.04)
IMM GRANULOCYTES NFR BLD AUTO: 0.4 %
INDIRECT COOMBS: NORMAL
INR PPP: 1.1
LACTATE SERPL-SCNC: 2.6 MMOL/L (ref 0.5–2.2)
LYMPHOCYTES # BLD AUTO: 1.01 X10(3)/MCL (ref 0.6–4.6)
LYMPHOCYTES NFR BLD AUTO: 9.4 %
MCH RBC QN AUTO: 30.1 PG (ref 27–31)
MCHC RBC AUTO-ENTMCNC: 32.8 G/DL (ref 33–36)
MCV RBC AUTO: 91.8 FL (ref 80–94)
MONOCYTES # BLD AUTO: 0.51 X10(3)/MCL (ref 0.1–1.3)
MONOCYTES NFR BLD AUTO: 4.7 %
NEUTROPHILS # BLD AUTO: 9.14 X10(3)/MCL (ref 2.1–9.2)
NEUTROPHILS NFR BLD AUTO: 84.8 %
NRBC BLD AUTO-RTO: 0 %
OHS QRS DURATION: 90 MS
OHS QTC CALCULATION: 495 MS
PLATELET # BLD AUTO: 138 X10(3)/MCL (ref 130–400)
PLATELETS.RETICULATED NFR BLD AUTO: 8.2 % (ref 0.9–11.2)
PMV BLD AUTO: 11.4 FL (ref 7.4–10.4)
POTASSIUM SERPL-SCNC: 3.5 MMOL/L (ref 3.5–5.1)
PREALB SERPL-MCNC: 27.8 MG/DL (ref 18–45)
PROT SERPL-MCNC: 7.2 GM/DL (ref 6.4–8.3)
PROTHROMBIN TIME: 13.9 SECONDS (ref 12.5–14.5)
RBC # BLD AUTO: 4.65 X10(6)/MCL (ref 4.7–6.1)
SODIUM SERPL-SCNC: 137 MMOL/L (ref 136–145)
SPECIMEN OUTDATE: NORMAL
TROPONIN I SERPL-MCNC: <0.01 NG/ML (ref 0–0.04)
TROPONIN I SERPL-MCNC: <0.01 NG/ML (ref 0–0.04)
WBC # SPEC AUTO: 10.77 X10(3)/MCL (ref 4.5–11.5)

## 2024-04-11 PROCEDURE — 37000009 HC ANESTHESIA EA ADD 15 MINS: Performed by: OPHTHALMOLOGY

## 2024-04-11 PROCEDURE — 25000003 PHARM REV CODE 250

## 2024-04-11 PROCEDURE — 25000003 PHARM REV CODE 250: Performed by: EMERGENCY MEDICINE

## 2024-04-11 PROCEDURE — 90715 TDAP VACCINE 7 YRS/> IM: CPT | Performed by: EMERGENCY MEDICINE

## 2024-04-11 PROCEDURE — 86140 C-REACTIVE PROTEIN: CPT | Performed by: NURSE PRACTITIONER

## 2024-04-11 PROCEDURE — 83605 ASSAY OF LACTIC ACID: CPT | Performed by: NURSE PRACTITIONER

## 2024-04-11 PROCEDURE — 93010 ELECTROCARDIOGRAM REPORT: CPT | Mod: ,,, | Performed by: INTERNAL MEDICINE

## 2024-04-11 PROCEDURE — 25000003 PHARM REV CODE 250: Performed by: NURSE ANESTHETIST, CERTIFIED REGISTERED

## 2024-04-11 PROCEDURE — 99222 1ST HOSP IP/OBS MODERATE 55: CPT | Mod: ,,, | Performed by: SURGERY

## 2024-04-11 PROCEDURE — 63600175 PHARM REV CODE 636 W HCPCS: Performed by: EMERGENCY MEDICINE

## 2024-04-11 PROCEDURE — 71000033 HC RECOVERY, INTIAL HOUR: Performed by: OPHTHALMOLOGY

## 2024-04-11 PROCEDURE — 85025 COMPLETE CBC W/AUTO DIFF WBC: CPT | Performed by: EMERGENCY MEDICINE

## 2024-04-11 PROCEDURE — 71000016 HC POSTOP RECOV ADDL HR: Performed by: OPHTHALMOLOGY

## 2024-04-11 PROCEDURE — D9220A PRA ANESTHESIA: Mod: ANES,,, | Performed by: ANESTHESIOLOGY

## 2024-04-11 PROCEDURE — 63600175 PHARM REV CODE 636 W HCPCS: Performed by: NURSE ANESTHETIST, CERTIFIED REGISTERED

## 2024-04-11 PROCEDURE — 84134 ASSAY OF PREALBUMIN: CPT | Performed by: NURSE PRACTITIONER

## 2024-04-11 PROCEDURE — 3E0234Z INTRODUCTION OF SERUM, TOXOID AND VACCINE INTO MUSCLE, PERCUTANEOUS APPROACH: ICD-10-PCS | Performed by: SURGERY

## 2024-04-11 PROCEDURE — 93005 ELECTROCARDIOGRAM TRACING: CPT

## 2024-04-11 PROCEDURE — 08Q9XZZ REPAIR LEFT CORNEA, EXTERNAL APPROACH: ICD-10-PCS | Performed by: OPHTHALMOLOGY

## 2024-04-11 PROCEDURE — 86901 BLOOD TYPING SEROLOGIC RH(D): CPT | Performed by: EMERGENCY MEDICINE

## 2024-04-11 PROCEDURE — 90471 IMMUNIZATION ADMIN: CPT | Performed by: EMERGENCY MEDICINE

## 2024-04-11 PROCEDURE — 71000015 HC POSTOP RECOV 1ST HR: Performed by: OPHTHALMOLOGY

## 2024-04-11 PROCEDURE — 11000001 HC ACUTE MED/SURG PRIVATE ROOM

## 2024-04-11 PROCEDURE — 36000706: Performed by: OPHTHALMOLOGY

## 2024-04-11 PROCEDURE — 84484 ASSAY OF TROPONIN QUANT: CPT | Performed by: NURSE PRACTITIONER

## 2024-04-11 PROCEDURE — 25000003 PHARM REV CODE 250: Performed by: OPHTHALMOLOGY

## 2024-04-11 PROCEDURE — 25000003 PHARM REV CODE 250: Performed by: NURSE PRACTITIONER

## 2024-04-11 PROCEDURE — 85730 THROMBOPLASTIN TIME PARTIAL: CPT | Performed by: EMERGENCY MEDICINE

## 2024-04-11 PROCEDURE — D9220A PRA ANESTHESIA: Mod: CRNA,,, | Performed by: NURSE ANESTHETIST, CERTIFIED REGISTERED

## 2024-04-11 PROCEDURE — 25500020 PHARM REV CODE 255: Performed by: SURGERY

## 2024-04-11 PROCEDURE — 63600175 PHARM REV CODE 636 W HCPCS: Performed by: OPHTHALMOLOGY

## 2024-04-11 PROCEDURE — 99223 1ST HOSP IP/OBS HIGH 75: CPT | Mod: ,,, | Performed by: SURGERY

## 2024-04-11 PROCEDURE — 36000707: Performed by: OPHTHALMOLOGY

## 2024-04-11 PROCEDURE — 85610 PROTHROMBIN TIME: CPT | Performed by: EMERGENCY MEDICINE

## 2024-04-11 PROCEDURE — 63600175 PHARM REV CODE 636 W HCPCS: Performed by: NURSE PRACTITIONER

## 2024-04-11 PROCEDURE — 80053 COMPREHEN METABOLIC PANEL: CPT | Performed by: EMERGENCY MEDICINE

## 2024-04-11 PROCEDURE — 82077 ASSAY SPEC XCP UR&BREATH IA: CPT | Performed by: EMERGENCY MEDICINE

## 2024-04-11 PROCEDURE — A4216 STERILE WATER/SALINE, 10 ML: HCPCS | Performed by: OPHTHALMOLOGY

## 2024-04-11 PROCEDURE — 37000008 HC ANESTHESIA 1ST 15 MINUTES: Performed by: OPHTHALMOLOGY

## 2024-04-11 RX ORDER — OXYCODONE HYDROCHLORIDE 5 MG/1
5 TABLET ORAL EVERY 6 HOURS PRN
Qty: 15 TABLET | Refills: 0 | Status: SHIPPED | OUTPATIENT
Start: 2024-04-11 | End: 2024-06-06 | Stop reason: ALTCHOICE

## 2024-04-11 RX ORDER — SODIUM CITRATE AND CITRIC ACID MONOHYDRATE 334; 500 MG/5ML; MG/5ML
30 SOLUTION ORAL
Status: CANCELLED | OUTPATIENT
Start: 2024-04-11

## 2024-04-11 RX ORDER — ROCURONIUM BROMIDE 10 MG/ML
INJECTION, SOLUTION INTRAVENOUS
Status: DISCONTINUED | OUTPATIENT
Start: 2024-04-11 | End: 2024-04-11

## 2024-04-11 RX ORDER — ENOXAPARIN SODIUM 100 MG/ML
40 INJECTION SUBCUTANEOUS EVERY 12 HOURS
Status: DISCONTINUED | OUTPATIENT
Start: 2024-04-11 | End: 2024-04-11 | Stop reason: HOSPADM

## 2024-04-11 RX ORDER — PROPOFOL 10 MG/ML
VIAL (ML) INTRAVENOUS
Status: DISCONTINUED | OUTPATIENT
Start: 2024-04-11 | End: 2024-04-11

## 2024-04-11 RX ORDER — PHENYLEPHRINE HCL IN 0.9% NACL 1 MG/10 ML
SYRINGE (ML) INTRAVENOUS
Status: DISCONTINUED | OUTPATIENT
Start: 2024-04-11 | End: 2024-04-11

## 2024-04-11 RX ORDER — FENTANYL CITRATE 50 UG/ML
INJECTION, SOLUTION INTRAMUSCULAR; INTRAVENOUS
Status: DISCONTINUED | OUTPATIENT
Start: 2024-04-11 | End: 2024-04-11

## 2024-04-11 RX ORDER — MEPERIDINE HYDROCHLORIDE 25 MG/ML
12.5 INJECTION INTRAMUSCULAR; INTRAVENOUS; SUBCUTANEOUS ONCE AS NEEDED
Status: CANCELLED | OUTPATIENT
Start: 2024-04-11 | End: 2024-04-12

## 2024-04-11 RX ORDER — LIDOCAINE HYDROCHLORIDE 20 MG/ML
INJECTION, SOLUTION EPIDURAL; INFILTRATION; INTRACAUDAL; PERINEURAL
Status: DISCONTINUED | OUTPATIENT
Start: 2024-04-11 | End: 2024-04-11

## 2024-04-11 RX ORDER — DEXAMETHASONE SODIUM PHOSPHATE 4 MG/ML
INJECTION, SOLUTION INTRA-ARTICULAR; INTRALESIONAL; INTRAMUSCULAR; INTRAVENOUS; SOFT TISSUE
Status: DISCONTINUED | OUTPATIENT
Start: 2024-04-11 | End: 2024-04-11

## 2024-04-11 RX ORDER — HYDROMORPHONE HYDROCHLORIDE 2 MG/ML
0.2 INJECTION, SOLUTION INTRAMUSCULAR; INTRAVENOUS; SUBCUTANEOUS EVERY 5 MIN PRN
Status: CANCELLED | OUTPATIENT
Start: 2024-04-11

## 2024-04-11 RX ORDER — POLYETHYLENE GLYCOL 3350 17 G/17G
17 POWDER, FOR SOLUTION ORAL 2 TIMES DAILY
Status: DISCONTINUED | OUTPATIENT
Start: 2024-04-11 | End: 2024-04-11 | Stop reason: HOSPADM

## 2024-04-11 RX ORDER — GABAPENTIN 300 MG/1
300 CAPSULE ORAL 3 TIMES DAILY
Status: DISCONTINUED | OUTPATIENT
Start: 2024-04-11 | End: 2024-04-11 | Stop reason: HOSPADM

## 2024-04-11 RX ORDER — LIDOCAINE HYDROCHLORIDE 10 MG/ML
1 INJECTION, SOLUTION EPIDURAL; INFILTRATION; INTRACAUDAL; PERINEURAL ONCE
Status: CANCELLED | OUTPATIENT
Start: 2024-04-11 | End: 2024-04-11

## 2024-04-11 RX ORDER — CEFAZOLIN SODIUM 1 G/3ML
INJECTION, POWDER, FOR SOLUTION INTRAMUSCULAR; INTRAVENOUS
Status: DISCONTINUED | OUTPATIENT
Start: 2024-04-11 | End: 2024-04-11 | Stop reason: HOSPADM

## 2024-04-11 RX ORDER — TALC
6 POWDER (GRAM) TOPICAL NIGHTLY PRN
Status: DISCONTINUED | OUTPATIENT
Start: 2024-04-11 | End: 2024-04-11 | Stop reason: HOSPADM

## 2024-04-11 RX ORDER — SODIUM CHLORIDE, SODIUM GLUCONATE, SODIUM ACETATE, POTASSIUM CHLORIDE AND MAGNESIUM CHLORIDE 30; 37; 368; 526; 502 MG/100ML; MG/100ML; MG/100ML; MG/100ML; MG/100ML
INJECTION, SOLUTION INTRAVENOUS CONTINUOUS
Status: CANCELLED | OUTPATIENT
Start: 2024-04-11 | End: 2024-05-11

## 2024-04-11 RX ORDER — ACETAMINOPHEN 325 MG/1
650 TABLET ORAL EVERY 4 HOURS
Status: DISCONTINUED | OUTPATIENT
Start: 2024-04-11 | End: 2024-04-11 | Stop reason: HOSPADM

## 2024-04-11 RX ORDER — OXYCODONE HYDROCHLORIDE 5 MG/1
5 TABLET ORAL EVERY 4 HOURS PRN
Status: DISCONTINUED | OUTPATIENT
Start: 2024-04-11 | End: 2024-04-11 | Stop reason: HOSPADM

## 2024-04-11 RX ORDER — ADHESIVE BANDAGE
30 BANDAGE TOPICAL DAILY PRN
Status: DISCONTINUED | OUTPATIENT
Start: 2024-04-11 | End: 2024-04-11 | Stop reason: HOSPADM

## 2024-04-11 RX ORDER — DOCUSATE SODIUM 100 MG/1
100 CAPSULE, LIQUID FILLED ORAL 2 TIMES DAILY
Status: DISCONTINUED | OUTPATIENT
Start: 2024-04-11 | End: 2024-04-11 | Stop reason: HOSPADM

## 2024-04-11 RX ORDER — HYDROCODONE BITARTRATE AND ACETAMINOPHEN 10; 325 MG/1; MG/1
1 TABLET ORAL
Status: COMPLETED | OUTPATIENT
Start: 2024-04-11 | End: 2024-04-11

## 2024-04-11 RX ORDER — ACETAMINOPHEN 10 MG/ML
INJECTION, SOLUTION INTRAVENOUS
Status: DISCONTINUED | OUTPATIENT
Start: 2024-04-11 | End: 2024-04-11

## 2024-04-11 RX ORDER — ONDANSETRON HYDROCHLORIDE 2 MG/ML
INJECTION, SOLUTION INTRAVENOUS
Status: DISCONTINUED | OUTPATIENT
Start: 2024-04-11 | End: 2024-04-11

## 2024-04-11 RX ORDER — METHOCARBAMOL 500 MG/1
500 TABLET, FILM COATED ORAL EVERY 8 HOURS
Status: DISCONTINUED | OUTPATIENT
Start: 2024-04-11 | End: 2024-04-11 | Stop reason: HOSPADM

## 2024-04-11 RX ORDER — GLYCOPYRROLATE 0.2 MG/ML
INJECTION INTRAMUSCULAR; INTRAVENOUS
Status: DISCONTINUED | OUTPATIENT
Start: 2024-04-11 | End: 2024-04-11

## 2024-04-11 RX ORDER — SODIUM CHLORIDE, SODIUM LACTATE, POTASSIUM CHLORIDE, CALCIUM CHLORIDE 600; 310; 30; 20 MG/100ML; MG/100ML; MG/100ML; MG/100ML
INJECTION, SOLUTION INTRAVENOUS CONTINUOUS
Status: DISCONTINUED | OUTPATIENT
Start: 2024-04-11 | End: 2024-04-11 | Stop reason: HOSPADM

## 2024-04-11 RX ORDER — ACETAMINOPHEN 10 MG/ML
1000 INJECTION, SOLUTION INTRAVENOUS ONCE
Status: CANCELLED | OUTPATIENT
Start: 2024-04-11 | End: 2024-04-11

## 2024-04-11 RX ORDER — OXYCODONE HYDROCHLORIDE 5 MG/1
5 TABLET ORAL EVERY 6 HOURS PRN
Qty: 15 TABLET | Refills: 0 | Status: SHIPPED | OUTPATIENT
Start: 2024-04-11 | End: 2024-04-11

## 2024-04-11 RX ORDER — OXYCODONE HYDROCHLORIDE 10 MG/1
10 TABLET ORAL EVERY 4 HOURS PRN
Status: DISCONTINUED | OUTPATIENT
Start: 2024-04-11 | End: 2024-04-11 | Stop reason: HOSPADM

## 2024-04-11 RX ADMIN — VANCOMYCIN HYDROCHLORIDE 1500 MG: 1.5 INJECTION, POWDER, LYOPHILIZED, FOR SOLUTION INTRAVENOUS at 04:04

## 2024-04-11 RX ADMIN — ACETAMINOPHEN 650 MG: 325 TABLET, FILM COATED ORAL at 10:04

## 2024-04-11 RX ADMIN — PROPOFOL 140 MG: 10 INJECTION, EMULSION INTRAVENOUS at 04:04

## 2024-04-11 RX ADMIN — Medication 100 MCG: at 05:04

## 2024-04-11 RX ADMIN — ENOXAPARIN SODIUM 40 MG: 100 INJECTION SUBCUTANEOUS at 10:04

## 2024-04-11 RX ADMIN — ONDANSETRON 4 MG: 2 INJECTION INTRAMUSCULAR; INTRAVENOUS at 04:04

## 2024-04-11 RX ADMIN — METHOCARBAMOL 500 MG: 500 TABLET ORAL at 10:04

## 2024-04-11 RX ADMIN — TETANUS TOXOID, REDUCED DIPHTHERIA TOXOID AND ACELLULAR PERTUSSIS VACCINE, ADSORBED 0.5 ML: 5; 2.5; 8; 8; 2.5 SUSPENSION INTRAMUSCULAR at 01:04

## 2024-04-11 RX ADMIN — SODIUM CHLORIDE, SODIUM GLUCONATE, SODIUM ACETATE, POTASSIUM CHLORIDE AND MAGNESIUM CHLORIDE: 526; 502; 368; 37; 30 INJECTION, SOLUTION INTRAVENOUS at 04:04

## 2024-04-11 RX ADMIN — LIDOCAINE HYDROCHLORIDE 4 ML: 20 INJECTION, SOLUTION EPIDURAL; INFILTRATION; INTRACAUDAL; PERINEURAL at 05:04

## 2024-04-11 RX ADMIN — VANCOMYCIN HYDROCHLORIDE 1 MG: 500 INJECTION, POWDER, LYOPHILIZED, FOR SOLUTION INTRAVENOUS at 05:04

## 2024-04-11 RX ADMIN — CEFTAZIDIME 1 G: 1 INJECTION, POWDER, FOR SOLUTION INTRAMUSCULAR; INTRAVENOUS at 03:04

## 2024-04-11 RX ADMIN — DOCUSATE SODIUM 100 MG: 50 CAPSULE, LIQUID FILLED ORAL at 10:04

## 2024-04-11 RX ADMIN — FENTANYL CITRATE 50 MCG: 50 INJECTION, SOLUTION INTRAMUSCULAR; INTRAVENOUS at 05:04

## 2024-04-11 RX ADMIN — GLYCOPYRROLATE 0.2 MG: 0.2 INJECTION INTRAMUSCULAR; INTRAVENOUS at 04:04

## 2024-04-11 RX ADMIN — ACETAMINOPHEN 500 MG: 10 INJECTION, SOLUTION INTRAVENOUS at 04:04

## 2024-04-11 RX ADMIN — SODIUM CHLORIDE 2 MG: 9 INJECTION INTRAMUSCULAR; INTRAVENOUS; SUBCUTANEOUS at 05:04

## 2024-04-11 RX ADMIN — HYDROCODONE BITARTRATE AND ACETAMINOPHEN 1 TABLET: 10; 325 TABLET ORAL at 01:04

## 2024-04-11 RX ADMIN — SUGAMMADEX 200 MG: 100 INJECTION, SOLUTION INTRAVENOUS at 05:04

## 2024-04-11 RX ADMIN — FENTANYL CITRATE 100 MCG: 50 INJECTION, SOLUTION INTRAMUSCULAR; INTRAVENOUS at 04:04

## 2024-04-11 RX ADMIN — GABAPENTIN 300 MG: 300 CAPSULE ORAL at 10:04

## 2024-04-11 RX ADMIN — IOHEXOL 92 ML: 350 INJECTION, SOLUTION INTRAVENOUS at 11:04

## 2024-04-11 RX ADMIN — ROCURONIUM BROMIDE 50 MG: 10 SOLUTION INTRAVENOUS at 04:04

## 2024-04-11 RX ADMIN — LIDOCAINE HYDROCHLORIDE 4 ML: 20 INJECTION, SOLUTION EPIDURAL; INFILTRATION; INTRACAUDAL; PERINEURAL at 04:04

## 2024-04-11 RX ADMIN — DEXAMETHASONE SODIUM PHOSPHATE 4 MG: 4 INJECTION, SOLUTION INTRA-ARTICULAR; INTRALESIONAL; INTRAMUSCULAR; INTRAVENOUS; SOFT TISSUE at 04:04
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
## 2024-04-11 NOTE — TRANSFER OF CARE
"Anesthesia Transfer of Care Note    Patient: Davis Thomas    Procedure(s) Performed: Procedure(s) (LRB):  REPAIR, OPEN GLOBE (Left)    Patient location: PACU    Anesthesia Type: general    Transport from OR: Transported from OR on room air with adequate spontaneous ventilation    Post pain: adequate analgesia    Post assessment: no apparent anesthetic complications    Post vital signs: stable    Level of consciousness: awake and alert    Nausea/Vomiting: no nausea/vomiting    Complications: none    Transfer of care protocol was followed      Last vitals: Visit Vitals  /60 (BP Location: Right arm, Patient Position: Lying)   Pulse 78   Temp 36.8 °C (98.2 °F) (Skin)   Resp 14   Ht 5' 10" (1.778 m)   Wt 72.6 kg (160 lb)   SpO2 99%   BMI 22.96 kg/m²     "
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
## 2024-04-11 NOTE — SUBJECTIVE & OBJECTIVE
No current facility-administered medications on file prior to encounter.     Current Outpatient Medications on File Prior to Encounter   Medication Sig    omeprazole (PRILOSEC) 40 MG capsule Take 40 mg by mouth.    prednisoLONE acetate (PRED FORTE) 1 % DrpS Place 1 drop into both eyes once a week.       Review of patient's allergies indicates:  No Known Allergies    Past Medical History:   Diagnosis Date    Keratoconus      Past Surgical History:   Procedure Laterality Date    CORNEAL TRANSPLANT Bilateral      Family History    None       Tobacco Use    Smoking status: Never     Passive exposure: Never    Smokeless tobacco: Never   Substance and Sexual Activity    Alcohol use: Yes     Comment: beer on occ    Drug use: Not Currently     Types: Marijuana    Sexual activity: Not on file     Review of Systems   Constitutional:  Negative for chills and fever.   HENT:  Positive for facial swelling. Negative for ear pain and trouble swallowing.         Loss of vision in L eye.    Eyes:  Negative for pain and redness.   Respiratory:  Negative for cough and chest tightness.    Cardiovascular:  Positive for chest pain. Negative for palpitations and leg swelling.   Gastrointestinal:  Negative for abdominal distention, abdominal pain, nausea and vomiting.   Genitourinary:  Negative for difficulty urinating.   Musculoskeletal:  Negative for back pain and neck pain.   Skin:  Negative for color change, pallor and wound.   Neurological:  Negative for dizziness, syncope, speech difficulty, weakness, light-headedness, numbness and headaches.   Psychiatric/Behavioral:  Negative for agitation and suicidal ideas.    All other systems reviewed and are negative.  Objective:     Vital Signs (Most Recent):  Temp: 96.8 °F (36 °C) (04/10/24 2348)  Pulse: 66 (04/11/24 0330)  Resp: 12 (04/11/24 0330)  BP: 106/70 (04/11/24 0330)  SpO2: 98 % (04/11/24 0330) Vital Signs (24h Range):  Temp:  [96.8 °F (36 °C)] 96.8 °F (36 °C)  Pulse:  []  66  Resp:  [12-20] 12  SpO2:  [98 %-100 %] 98 %  BP: (106-123)/(68-88) 106/70     Weight: 72.6 kg (160 lb)  Body mass index is 22.96 kg/m².     Physical Exam  Constitutional:       Appearance: Normal appearance.   HENT:      Head: Normocephalic.      Nose: Nose normal.   Eyes:      Comments: L eye difficult to assess due to swelling. Appears retracted with fluid leak.    Cardiovascular:      Rate and Rhythm: Normal rate.      Pulses: Normal pulses.      Comments: Normal peripheral pulses  Pulmonary:      Effort: Pulmonary effort is normal. No respiratory distress.   Chest:      Chest wall: Tenderness present.   Abdominal:      General: Abdomen is flat. Bowel sounds are normal. There is no distension.      Palpations: Abdomen is soft.      Tenderness: There is no abdominal tenderness.   Musculoskeletal:         General: No swelling, tenderness, deformity or signs of injury.      Cervical back: Normal range of motion and neck supple. No tenderness.   Skin:     General: Skin is warm and dry.      Capillary Refill: Capillary refill takes less than 2 seconds.      Findings: No lesion.   Neurological:      General: No focal deficit present.      Mental Status: He is alert and oriented to person, place, and time. Mental status is at baseline.   Psychiatric:         Mood and Affect: Mood normal.         Behavior: Behavior normal.         Thought Content: Thought content normal.         Judgment: Judgment normal.        I have reviewed all pertinent lab results within the past 24 hours.    Significant Diagnostics:  I have reviewed all pertinent imaging results/findings within the past 24 hours.

## 2024-04-11 NOTE — ANESTHESIA POSTPROCEDURE EVALUATION
Anesthesia Post Evaluation    Patient: Davis Thomas    Procedure(s) Performed: Procedure(s) (LRB):  REPAIR, OPEN GLOBE (Left)    Final Anesthesia Type: general (/Regional//MAC)      Patient location during evaluation: PACU  Post-procedure mental status: @ basline.  Pain management: adequate    PONV status: See postop meds for drugs used to control n/v if any.  Anesthetic complications: no      Cardiovascular status: blood pressure returned to baseline  Respiratory status: @ baseline.  Hydration status: euvolemic                Vitals Value Taken Time   /64 04/11/24 1132   Temp 37.1 °C (98.7 °F) 04/11/24 1130   Pulse 88 04/11/24 1147   Resp 16 04/11/24 1145   SpO2 96 % 04/11/24 1147   Vitals shown include unvalidated device data.      Event Time   Out of Recovery 07:15:00         Pain/Veronika Score: Pain Rating Prior to Med Admin: 5 (4/11/2024 10:07 AM)  Veronika Score: 9 (4/11/2024  9:00 AM)
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
## 2024-04-11 NOTE — NURSING
Patient discharged home accompanied by family members. Discharge instructions given, pt verbalized understanding, questions answered. Follow-up appointment made. IV removed, VSS, pt in no apparent distress. Pt admitted to floor for less than 2 hours. In this short time, pt was ambulated and able to void independently. Focused assessment completed. Pt satisfied with care.
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
## 2024-04-11 NOTE — ANESTHESIA PREPROCEDURE EVALUATION
"                                                                                                             04/11/2024  Davis Thomas is a 39 y.o., male.      Pre-op Assessment    I have reviewed the Patient Summary Reports.     I have reviewed the Nursing Notes. I have reviewed the NPO Status.   I have reviewed the Medications.     Review of Systems  Cardiovascular:  Exercise tolerance: good                                               Physical Exam  General: Well nourished and Cooperative    Airway:  Mallampati: II   Mouth Opening: Normal  TM Distance: Normal  Tongue: Normal  Neck ROM: Normal ROM    Dental:  Intact    Chest/Lungs:  Clear to auscultation    Heart:  Rhythm: Regular Rhythm        Anesthesia Plan  Type of Anesthesia, risks & benefits discussed:    Anesthesia Type: Gen ETT  Intra-op Monitoring Plan: Standard ASA Monitors  Post Op Pain Control Plan: multimodal analgesia  Induction:  rapid sequence and IV  ASA Score: 2 Emergent  Day of Surgery Review of History & Physical: H&P Update referred to the surgeon/provider.    Ready For Surgery From Anesthesia Perspective.     .  I explained anesthesia plan to patient/responsbile party if available.  Anesthesia consent done going over the material facts, risks, complications & alternatives, obtained which includes the possibility of altering the anesthesia plan.  I reviewed problem list, prior to admission medication list, appropriate labs, any workup, Xray, EKG etc noted below.  Patients condition is satisfactory to proceed with anesthesia plan unless otherwise noted (see anesthesia chart for details of the anesthesia plan carried out).      Pre-operative evaluation for Procedure(s) (LRB):  REPAIR, OPEN GLOBE (N/A)    /78   Pulse 67   Temp 36 °C (96.8 °F)   Resp 20   Ht 5' 10" (1.778 m)   Wt 72.6 kg (160 lb)   SpO2 96%   BMI 22.96 kg/m²     Patient Active Problem List   Diagnosis    Marfan's syndrome    Status post corneal transplant    " Encounter to establish care    Rupture of globe    Nasal fracture    Chest pain       Review of patient's allergies indicates:  No Known Allergies    Current Outpatient Medications   Medication Instructions    omeprazole (PRILOSEC) 40 mg, Oral    prednisoLONE acetate (PRED FORTE) 1 % DrpS 1 drop, Both Eyes, Weekly       Past Surgical History:   Procedure Laterality Date    CORNEAL TRANSPLANT Bilateral        Social History     Socioeconomic History    Marital status: Unknown   Tobacco Use    Smoking status: Never     Passive exposure: Never    Smokeless tobacco: Never   Substance and Sexual Activity    Alcohol use: Yes     Comment: beer on occ    Drug use: Not Currently     Types: Marijuana     Social Determinants of Health     Transportation Needs: No Transportation Needs (9/7/2023)    PRAPARE - Transportation     Lack of Transportation (Medical): No     Lack of Transportation (Non-Medical): No   Housing Stability: Low Risk  (9/7/2023)    Housing Stability Vital Sign     Unable to Pay for Housing in the Last Year: No     Number of Places Lived in the Last Year: 1     Unstable Housing in the Last Year: No       Lab Results   Component Value Date    WBC 10.77 04/11/2024    HGB 14.0 04/11/2024    HCT 42.7 04/11/2024    MCV 91.8 04/11/2024     04/11/2024          BMP  Lab Results   Component Value Date    HCT 42.7 04/11/2024     04/11/2024    K 3.5 04/11/2024    BUN 6.9 (L) 04/11/2024    CREATININE 0.92 04/11/2024    CALCIUM 8.8 04/11/2024        INR  Recent Labs     04/11/24  0209   INR 1.1   PROTIME 13.9           Diagnostic Studies:      EKG:  No results found for this or any previous visit.
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
## 2024-04-11 NOTE — ANESTHESIA PROCEDURE NOTES
Intubation    Date/Time: 4/11/2024 4:28 AM    Performed by: Matteo Solares CRNA  Authorized by: William Swift MD    Intubation:     Induction:  Rapid sequence induction    Intubated:  Postinduction    Mask Ventilation:  Not attempted    Attempts:  1    Attempted By:  CRNA    Method of Intubation:  Direct    Blade:  Benjamin 2    Laryngeal View Grade: Grade I - full view of cords      Difficult Airway Encountered?: No      Complications:  None    Airway Device:  Oral endotracheal tube    Airway Device Size:  7.5    Style/Cuff Inflation:  Cuffed    Inflation Amount (mL):  7    Tube secured:  22    Secured at:  The lips    Placement Verified By:  Capnometry    Complicating Factors:  None    Findings Post-Intubation:  BS equal bilateral and atraumatic/condition of teeth unchanged
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
## 2024-04-17 ENCOUNTER — HOSPITAL ENCOUNTER (EMERGENCY)
Facility: HOSPITAL | Age: 39
Discharge: HOME | End: 2024-04-17
Attending: INTERNAL MEDICINE
Payer: MEDICAID

## 2024-04-17 VITALS
BODY MASS INDEX: 18.74 KG/M2 | RESPIRATION RATE: 14 BRPM | HEART RATE: 53 BPM | OXYGEN SATURATION: 100 % | SYSTOLIC BLOOD PRESSURE: 130 MMHG | DIASTOLIC BLOOD PRESSURE: 77 MMHG | TEMPERATURE: 99 F | WEIGHT: 146 LBS | HEIGHT: 74 IN

## 2024-04-17 DIAGNOSIS — H57.12 LEFT EYE PAIN: Primary | ICD-10-CM

## 2024-04-17 DIAGNOSIS — R07.89 LEFT-SIDED CHEST WALL PAIN: ICD-10-CM

## 2024-04-17 DIAGNOSIS — H40.9 GLAUCOMA OF LEFT EYE, UNSPECIFIED GLAUCOMA TYPE: ICD-10-CM

## 2024-04-17 PROCEDURE — 99284 EMERGENCY DEPT VISIT MOD MDM: CPT | Mod: 25

## 2024-04-17 PROCEDURE — 63600175 PHARM REV CODE 636 W HCPCS: Performed by: NURSE PRACTITIONER

## 2024-04-17 PROCEDURE — 96374 THER/PROPH/DIAG INJ IV PUSH: CPT

## 2024-04-17 PROCEDURE — 25000003 PHARM REV CODE 250: Performed by: NURSE PRACTITIONER

## 2024-04-17 PROCEDURE — 93005 ELECTROCARDIOGRAM TRACING: CPT

## 2024-04-17 RX ORDER — BRIMONIDINE TARTRATE 2 MG/ML
1 SOLUTION/ DROPS OPHTHALMIC 3 TIMES DAILY
Qty: 10 ML | Refills: 0 | Status: SHIPPED | OUTPATIENT
Start: 2024-04-17 | End: 2024-05-02 | Stop reason: SDUPTHER

## 2024-04-17 RX ORDER — OXYCODONE AND ACETAMINOPHEN 5; 325 MG/1; MG/1
2 TABLET ORAL
Status: COMPLETED | OUTPATIENT
Start: 2024-04-17 | End: 2024-04-17

## 2024-04-17 RX ORDER — TIMOLOL MALEATE 5 MG/ML
2 SOLUTION/ DROPS OPHTHALMIC ONCE
Status: COMPLETED | OUTPATIENT
Start: 2024-04-17 | End: 2024-04-17

## 2024-04-17 RX ORDER — ACETAZOLAMIDE 500 MG/5ML
500 INJECTION, POWDER, LYOPHILIZED, FOR SOLUTION INTRAVENOUS ONCE
Status: COMPLETED | OUTPATIENT
Start: 2024-04-17 | End: 2024-04-17

## 2024-04-17 RX ORDER — TIMOLOL MALEATE 5 MG/ML
1 SOLUTION/ DROPS OPHTHALMIC 2 TIMES DAILY
Qty: 10 ML | Refills: 0 | Status: SHIPPED | OUTPATIENT
Start: 2024-04-17 | End: 2024-05-02 | Stop reason: SDUPTHER

## 2024-04-17 RX ORDER — BRIMONIDINE TARTRATE 2 MG/ML
1 SOLUTION/ DROPS OPHTHALMIC ONCE
Status: COMPLETED | OUTPATIENT
Start: 2024-04-17 | End: 2024-04-17

## 2024-04-17 RX ADMIN — BRIMONIDINE TARTRATE 1 DROP: 2 SOLUTION/ DROPS OPHTHALMIC at 06:04

## 2024-04-17 RX ADMIN — TIMOLOL MALEATE 2 DROP: 5 SOLUTION/ DROPS OPHTHALMIC at 05:04

## 2024-04-17 RX ADMIN — ACETAZOLAMIDE SODIUM 500 MG: 500 INJECTION, POWDER, LYOPHILIZED, FOR SOLUTION INTRAVENOUS at 06:04

## 2024-04-17 RX ADMIN — OXYCODONE HYDROCHLORIDE AND ACETAMINOPHEN 2 TABLET: 5; 325 TABLET ORAL at 04:04
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
## 2024-04-17 NOTE — FIRST PROVIDER EVALUATION
"Medical screening examination initiated.  I have conducted a focused provider triage encounter, findings are as follows:    Brief history of present illness:  L eye pain, swelling & HA. Patient had open globe repair at [facility] 6 days ago.    Vitals:    04/17/24 1542   BP: (!) 151/76   Pulse: 62   Resp: 18   Temp: 99.1 °F (37.3 °C)   TempSrc: Oral   SpO2: 100%   Weight: 66.2 kg (146 lb)   Height: 6' 2" (1.88 m)       Pertinent physical exam:  VSS    Brief workup plan:  Visual acuity ordered. Will defer imaging to provider taking care of patient.    Preliminary workup initiated; this workup will be continued and followed by the physician or advanced practice provider that is assigned to the patient when roomed.  "

## 2024-04-18 ENCOUNTER — TELEPHONE (OUTPATIENT)
Dept: OPHTHALMOLOGY | Facility: CLINIC | Age: 39
End: 2024-04-18

## 2024-04-18 ENCOUNTER — OFFICE VISIT (OUTPATIENT)
Dept: OPHTHALMOLOGY | Facility: CLINIC | Age: 39
End: 2024-04-18
Payer: MEDICAID

## 2024-04-18 VITALS — BODY MASS INDEX: 18.73 KG/M2 | WEIGHT: 145.94 LBS | HEIGHT: 74 IN

## 2024-04-18 DIAGNOSIS — S05.32XD RUPTURED GLOBE, LEFT EYE, SUBSEQUENT ENCOUNTER: Primary | ICD-10-CM

## 2024-04-18 LAB
OHS QRS DURATION: 88 MS
OHS QTC CALCULATION: 442 MS

## 2024-04-18 PROCEDURE — 99212 OFFICE O/P EST SF 10 MIN: CPT | Mod: PBBFAC,PN | Performed by: STUDENT IN AN ORGANIZED HEALTH CARE EDUCATION/TRAINING PROGRAM

## 2024-04-18 RX ORDER — PREDNISOLONE ACETATE 10 MG/ML
1 SUSPENSION/ DROPS OPHTHALMIC 4 TIMES DAILY
Qty: 10 ML | Refills: 5 | Status: SHIPPED | OUTPATIENT
Start: 2024-04-18 | End: 2024-04-26 | Stop reason: SDUPTHER

## 2024-04-18 RX ORDER — ATROPINE SULFATE 10 MG/ML
1 SOLUTION/ DROPS OPHTHALMIC 2 TIMES DAILY
Qty: 5 ML | Refills: 2 | Status: SHIPPED | OUTPATIENT
Start: 2024-04-18 | End: 2024-05-02 | Stop reason: SDUPTHER

## 2024-04-18 RX ORDER — ATROPINE SULFATE 10 MG/ML
1 SOLUTION/ DROPS OPHTHALMIC 2 TIMES DAILY
Qty: 5 ML | Refills: 2 | Status: SHIPPED | OUTPATIENT
Start: 2024-04-18 | End: 2024-04-18

## 2024-04-18 RX ORDER — PREDNISOLONE ACETATE 10 MG/ML
1 SUSPENSION/ DROPS OPHTHALMIC WEEKLY
Qty: 20 ML | Refills: 5 | Status: SHIPPED | OUTPATIENT
Start: 2024-04-18 | End: 2024-04-18

## 2024-04-18 RX ORDER — TOBRAMYCIN 3 MG/ML
SOLUTION/ DROPS OPHTHALMIC
COMMUNITY
Start: 2024-04-12

## 2024-04-18 NOTE — TELEPHONE ENCOUNTER
Spoke with pharmacy staff after speaking with Dr. Marin. Informed that Dr. Marin ordered for pt to use pred forte gtts into Left Eye QID. New rx sent in with. Directions clarified. Understanding voiced.

## 2024-04-18 NOTE — TELEPHONE ENCOUNTER
----- Message from Jl Gutierrez sent at 4/18/2024 12:32 PM CDT -----  [facility] pharmacy is calling to see if the directions for the prednisoLONE acetate (PRED FORTE) 1 % DrpS are correct. Please call back to further assist.      [facility][id number] [address] AT [facility] [place] [address] [address]  Phone: [phone] Fax: [phone]
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
## 2024-04-26 ENCOUNTER — OFFICE VISIT (OUTPATIENT)
Dept: OPHTHALMOLOGY | Facility: CLINIC | Age: 39
End: 2024-04-26
Payer: MEDICAID

## 2024-04-26 VITALS — HEIGHT: 74 IN | WEIGHT: 145.94 LBS | BODY MASS INDEX: 18.73 KG/M2

## 2024-04-26 DIAGNOSIS — Z94.7 STATUS POST CORNEAL TRANSPLANT: Primary | ICD-10-CM

## 2024-04-26 DIAGNOSIS — S05.32XD RUPTURED GLOBE, LEFT EYE, SUBSEQUENT ENCOUNTER: ICD-10-CM

## 2024-04-26 PROCEDURE — 99213 OFFICE O/P EST LOW 20 MIN: CPT | Mod: PBBFAC,PN | Performed by: STUDENT IN AN ORGANIZED HEALTH CARE EDUCATION/TRAINING PROGRAM

## 2024-04-26 RX ORDER — PREDNISOLONE ACETATE 10 MG/ML
1 SUSPENSION/ DROPS OPHTHALMIC 4 TIMES DAILY
Qty: 10 ML | Refills: 5 | Status: SHIPPED | OUTPATIENT
Start: 2024-04-26 | End: 2024-05-02 | Stop reason: SDUPTHER
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
## 2024-05-02 DIAGNOSIS — Z94.7 STATUS POST CORNEAL TRANSPLANT: Primary | ICD-10-CM

## 2024-05-02 RX ORDER — PREDNISOLONE ACETATE 10 MG/ML
1 SUSPENSION/ DROPS OPHTHALMIC 4 TIMES DAILY
Qty: 10 ML | Refills: 5 | Status: SHIPPED | OUTPATIENT
Start: 2024-05-02 | End: 2024-05-10 | Stop reason: SDUPTHER

## 2024-05-02 RX ORDER — BRIMONIDINE TARTRATE 2 MG/ML
1 SOLUTION/ DROPS OPHTHALMIC 3 TIMES DAILY
Qty: 10 ML | Refills: 5 | Status: SHIPPED | OUTPATIENT
Start: 2024-05-02 | End: 2024-05-03

## 2024-05-02 RX ORDER — ATROPINE SULFATE 10 MG/ML
1 SOLUTION/ DROPS OPHTHALMIC 2 TIMES DAILY
Qty: 5 ML | Refills: 2 | Status: SHIPPED | OUTPATIENT
Start: 2024-05-02 | End: 2024-05-10 | Stop reason: SDUPTHER

## 2024-05-02 RX ORDER — TIMOLOL MALEATE 5 MG/ML
1 SOLUTION/ DROPS OPHTHALMIC 2 TIMES DAILY
Qty: 10 ML | Refills: 5 | Status: SHIPPED | OUTPATIENT
Start: 2024-05-02 | End: 2024-05-03

## 2024-05-02 NOTE — TELEPHONE ENCOUNTER
Patient states that his mother tried to  his drops and the pharmacy stated they did have any refills.    Please review refill request. Thank you.

## 2024-05-03 RX ORDER — TIMOLOL MALEATE 5 MG/ML
1 SOLUTION/ DROPS OPHTHALMIC 2 TIMES DAILY
Qty: 10 ML | Refills: 5 | Status: SHIPPED | OUTPATIENT
Start: 2024-05-03 | End: 2024-06-06

## 2024-05-03 RX ORDER — BRIMONIDINE TARTRATE 2 MG/ML
1 SOLUTION/ DROPS OPHTHALMIC 3 TIMES DAILY
Qty: 10 ML | Refills: 5 | Status: SHIPPED | OUTPATIENT
Start: 2024-05-03 | End: 2024-06-06

## 2024-05-10 ENCOUNTER — OFFICE VISIT (OUTPATIENT)
Dept: OPHTHALMOLOGY | Facility: CLINIC | Age: 39
End: 2024-05-10
Payer: MEDICAID

## 2024-05-10 VITALS — WEIGHT: 145.5 LBS | HEIGHT: 74 IN | BODY MASS INDEX: 18.67 KG/M2

## 2024-05-10 DIAGNOSIS — Z94.7 STATUS POST CORNEAL TRANSPLANT: ICD-10-CM

## 2024-05-10 DIAGNOSIS — S05.32XD RUPTURED GLOBE, LEFT EYE, SUBSEQUENT ENCOUNTER: Primary | ICD-10-CM

## 2024-05-10 PROCEDURE — 99212 OFFICE O/P EST SF 10 MIN: CPT | Mod: PBBFAC,PN

## 2024-05-10 RX ORDER — ATROPINE SULFATE 10 MG/ML
1 SOLUTION/ DROPS OPHTHALMIC 2 TIMES DAILY
Qty: 5 ML | Refills: 2 | Status: SHIPPED | OUTPATIENT
Start: 2024-05-10 | End: 2024-06-13 | Stop reason: SDUPTHER

## 2024-05-10 RX ORDER — PREDNISOLONE ACETATE 10 MG/ML
1 SUSPENSION/ DROPS OPHTHALMIC
Qty: 10 ML | Refills: 5 | Status: SHIPPED | OUTPATIENT
Start: 2024-05-10 | End: 2024-06-13 | Stop reason: SDUPTHER
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
## 2024-05-13 ENCOUNTER — TELEPHONE (OUTPATIENT)
Dept: OPHTHALMOLOGY | Facility: CLINIC | Age: 39
End: 2024-05-13
Payer: MEDICAID

## 2024-05-13 NOTE — TELEPHONE ENCOUNTER
"----- Message from Samira Stockton sent at 5/13/2024 10:17 AM CDT -----  Regarding: Needs Drops!!  Mrs Trevino, mom, called and stated that her son Davis has misplaced his "Red and pink top" eyedrops. Pharmacy said that it is too early to fill and if she were to fill early then she would have to pay. Can't afford it. Wants to know if we could help in some way. Thank you!! :)  "

## 2024-06-06 ENCOUNTER — OFFICE VISIT (OUTPATIENT)
Dept: INTERNAL MEDICINE | Facility: CLINIC | Age: 39
End: 2024-06-06
Payer: MEDICAID

## 2024-06-06 VITALS
RESPIRATION RATE: 18 BRPM | HEART RATE: 69 BPM | HEIGHT: 74 IN | BODY MASS INDEX: 20.57 KG/M2 | DIASTOLIC BLOOD PRESSURE: 76 MMHG | SYSTOLIC BLOOD PRESSURE: 118 MMHG | TEMPERATURE: 98 F | OXYGEN SATURATION: 100 % | WEIGHT: 160.25 LBS

## 2024-06-06 DIAGNOSIS — Z13.21 ENCOUNTER FOR VITAMIN DEFICIENCY SCREENING: ICD-10-CM

## 2024-06-06 DIAGNOSIS — Z12.5 SCREENING FOR PROSTATE CANCER: ICD-10-CM

## 2024-06-06 DIAGNOSIS — M21.611 BUNION OF GREAT TOE OF RIGHT FOOT: ICD-10-CM

## 2024-06-06 DIAGNOSIS — Z13.1 SCREENING FOR DIABETES MELLITUS: ICD-10-CM

## 2024-06-06 DIAGNOSIS — Z13.0 SCREENING FOR IRON DEFICIENCY ANEMIA: ICD-10-CM

## 2024-06-06 DIAGNOSIS — Z76.89 ENCOUNTER TO ESTABLISH CARE: Primary | ICD-10-CM

## 2024-06-06 DIAGNOSIS — Z13.29 SCREENING FOR THYROID DISORDER: ICD-10-CM

## 2024-06-06 DIAGNOSIS — Z13.220 SCREENING FOR LIPID DISORDERS: ICD-10-CM

## 2024-06-06 PROCEDURE — 3078F DIAST BP <80 MM HG: CPT | Mod: CPTII,,,

## 2024-06-06 PROCEDURE — 3074F SYST BP LT 130 MM HG: CPT | Mod: CPTII,,,

## 2024-06-06 PROCEDURE — 3008F BODY MASS INDEX DOCD: CPT | Mod: CPTII,,,

## 2024-06-06 PROCEDURE — 1160F RVW MEDS BY RX/DR IN RCRD: CPT | Mod: CPTII,,,

## 2024-06-06 PROCEDURE — 1159F MED LIST DOCD IN RCRD: CPT | Mod: CPTII,,,

## 2024-06-06 PROCEDURE — 99213 OFFICE O/P EST LOW 20 MIN: CPT | Mod: S$PBB,,,

## 2024-06-06 PROCEDURE — 99215 OFFICE O/P EST HI 40 MIN: CPT | Mod: PBBFAC

## 2024-06-06 RX ORDER — PREDNISONE 20 MG/1
20 TABLET ORAL 2 TIMES DAILY
Qty: 10 TABLET | Refills: 0 | Status: SHIPPED | OUTPATIENT
Start: 2024-06-06 | End: 2024-06-11

## 2024-06-06 RX ORDER — IBUPROFEN 800 MG/1
800 TABLET ORAL 3 TIMES DAILY PRN
Qty: 60 TABLET | Refills: 2 | Status: SHIPPED | OUTPATIENT
Start: 2024-06-06 | End: 2025-06-06
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
## 2024-06-06 NOTE — ASSESSMENT & PLAN NOTE
Wellness labs ordered - to be discussed at next office visit.  Encouraged patient to utilize patient portal for messaging.

## 2024-06-13 ENCOUNTER — OFFICE VISIT (OUTPATIENT)
Dept: OPHTHALMOLOGY | Facility: CLINIC | Age: 39
End: 2024-06-13
Payer: MEDICAID

## 2024-06-13 VITALS — WEIGHT: 160.25 LBS | BODY MASS INDEX: 20.57 KG/M2 | HEIGHT: 74 IN

## 2024-06-13 DIAGNOSIS — S05.32XD RUPTURED GLOBE, LEFT EYE, SUBSEQUENT ENCOUNTER: Primary | ICD-10-CM

## 2024-06-13 DIAGNOSIS — Z94.7 STATUS POST CORNEAL TRANSPLANT: ICD-10-CM

## 2024-06-13 PROCEDURE — 99213 OFFICE O/P EST LOW 20 MIN: CPT | Mod: PBBFAC,PN,25 | Performed by: STUDENT IN AN ORGANIZED HEALTH CARE EDUCATION/TRAINING PROGRAM

## 2024-06-13 PROCEDURE — 76512 OPH US DX B-SCAN: CPT | Mod: PBBFAC,PN,GC | Performed by: STUDENT IN AN ORGANIZED HEALTH CARE EDUCATION/TRAINING PROGRAM

## 2024-06-13 RX ORDER — PREDNISOLONE ACETATE 10 MG/ML
1 SUSPENSION/ DROPS OPHTHALMIC
Qty: 10 ML | Refills: 2 | Status: SHIPPED | OUTPATIENT
Start: 2024-06-13

## 2024-06-13 RX ORDER — ATROPINE SULFATE 10 MG/ML
1 SOLUTION/ DROPS OPHTHALMIC 2 TIMES DAILY
Qty: 5 ML | Refills: 11 | Status: SHIPPED | OUTPATIENT
Start: 2024-06-13

## 2024-06-13 NOTE — PROGRESS NOTES
HPI    1. S/p open globe repair 4/11/24 OS with [facility]  - dehisced DALK with rupture of Descemet's? [facility] wrote had essentially   open emely cornea  s:              Increase PF 6x/day              Atropine BID              Timolol BID              Brimonidine BID      Last edited by Alda Hughes MA on 6/13/2024  2:36 PM.            Assessment /Plan     For exam results, see Encounter Report.    Ruptured globe, left eye, subsequent encounter        B-scan:  - 4/18/24:  OS: dense hyaloid deposit suspended in vitreous? No gross lens fragments, retina flat  - 6/13/24:  OS: funnel RD, no masses.       1. S/p open globe repair 4/11/24 OS with [facility]  - dehisced DALK with rupture of Descemet's? [facility] noted patient had essentially open emely cornea  - NLP at time of injury (4/11/24), LP on 4/18/24 exam  - Return precautions discussed  - 4/26/24: Blem ok to have us follow. Will recheck in 2 weeks and can discuss bringing to [facility] clinic day for further eval if he would recommend PPV in future.  - 5/10/24: vision stable, IOP good. Tyrone negative. Cornea diffusely edematous with corneal blood staining. Will bring back in 2 weeks in [facility] for possible PKP eval.   - Medications:  PF 6x/day - 4-3-2-1 taper  Atropine BID - go down to 1x/day  Timolol BID - stop  Brimonidine BID- stop  - B-scan OS with Funnel RD, poor vision potential discussed with patient.  - Comfort care recommended, per Dr. Blanco. RTC General for MRx and Polycarbonate lenses.      RTC Francis PRN  RTC General clinic in 2mo for MRx and polycarbonate lenses.       Not directly addressed below:  1. Keratoconus OS   - s/p DALK OS 1/2020  - s/p AK for astigmatism 5/21  - Wear protective glasses during the day at all times  - Worsening astig, discussed that pt would likely not benefit from repeat AK at this point. Patient has thick cornea and would likely benefit from PRK however unable to afford as noted below  - Mrx given 12/2023, patient never got glasses due  to unable to afford them  - 12/7/23: using PF daily still, decrease to once weekly, drops refilled; states vision is getting worse. Topography showed worsening astigmatism. Discussed with patient hard contact lens vs PRK, patient unable to afford now but is waiting for disability (still).  RTC PRN for PRK logistics if/when interested    2. Keratoconus OD  - S/p PKP (11/6/14)  - S/p AK 10/7/19  - Cornea has clear central PKP graft  - continue PF once weekly; drops refilled  - Patient indicated that there were barriers to using contact lenses, including affordability and difficulty placing them  - Per Dr. Blanco can consider PRK to reduce astigmatism. Spoke with Dr. Alonso's office, cost would be $250 for standard.  - 3/14/24: no change on exam, grafts look great.  Can continue once weekly PF and be seen when he is ready for PRK    3. Open angle with borderline findings, OU  - (+) Fhx  - Gonio 10/2018: open to  OU, light pigmentation  - Large nerves, 0.6  - Baseline HVF 10/2018 unreliable, likely due to irregular astigmatism (will follow with OCT)  - OCT RNFL all green  - HVF without glaucaomatous changes  - IOP wnl    4. MGD OU  - Start WC/LS/ATs 4-6 times/day (handout given)    5. Marfan's Syndrome  - seen by PCP with workup initiated  - echo done  - no obvious lens subluxation on exam

## 2024-07-09 ENCOUNTER — LAB VISIT (OUTPATIENT)
Dept: LAB | Facility: HOSPITAL | Age: 39
End: 2024-07-09
Payer: MEDICAID

## 2024-07-09 DIAGNOSIS — Z12.5 SCREENING FOR PROSTATE CANCER: ICD-10-CM

## 2024-07-09 DIAGNOSIS — Z13.1 SCREENING FOR DIABETES MELLITUS: ICD-10-CM

## 2024-07-09 DIAGNOSIS — Z13.220 SCREENING FOR LIPID DISORDERS: ICD-10-CM

## 2024-07-09 DIAGNOSIS — Z13.21 ENCOUNTER FOR VITAMIN DEFICIENCY SCREENING: ICD-10-CM

## 2024-07-09 DIAGNOSIS — Z13.0 SCREENING FOR IRON DEFICIENCY ANEMIA: ICD-10-CM

## 2024-07-09 DIAGNOSIS — Z13.29 SCREENING FOR THYROID DISORDER: ICD-10-CM

## 2024-07-09 DIAGNOSIS — Z76.89 ENCOUNTER TO ESTABLISH CARE: ICD-10-CM

## 2024-07-09 LAB
25(OH)D3+25(OH)D2 SERPL-MCNC: 34 NG/ML (ref 30–80)
ALBUMIN SERPL-MCNC: 4.1 G/DL (ref 3.5–5)
ALBUMIN/GLOB SERPL: 1.4 RATIO (ref 1.1–2)
ALP SERPL-CCNC: 82 UNIT/L (ref 40–150)
ALT SERPL-CCNC: 15 UNIT/L (ref 0–55)
ANION GAP SERPL CALC-SCNC: 7 MEQ/L
AST SERPL-CCNC: 17 UNIT/L (ref 5–34)
BASOPHILS # BLD AUTO: 0.05 X10(3)/MCL
BASOPHILS NFR BLD AUTO: 1.1 %
BILIRUB SERPL-MCNC: 0.7 MG/DL
BUN SERPL-MCNC: 6.7 MG/DL (ref 8.9–20.6)
CALCIUM SERPL-MCNC: 9.4 MG/DL (ref 8.4–10.2)
CHLORIDE SERPL-SCNC: 105 MMOL/L (ref 98–107)
CHOLEST SERPL-MCNC: 129 MG/DL
CHOLEST/HDLC SERPL: 2 {RATIO} (ref 0–5)
CO2 SERPL-SCNC: 28 MMOL/L (ref 22–29)
CREAT SERPL-MCNC: 0.96 MG/DL (ref 0.73–1.18)
CREAT/UREA NIT SERPL: 7
EOSINOPHIL # BLD AUTO: 0.33 X10(3)/MCL (ref 0–0.9)
EOSINOPHIL NFR BLD AUTO: 7.4 %
ERYTHROCYTE [DISTWIDTH] IN BLOOD BY AUTOMATED COUNT: 11.8 % (ref 11.5–17)
EST. AVERAGE GLUCOSE BLD GHB EST-MCNC: 85.3 MG/DL
FERRITIN SERPL-MCNC: 178 NG/ML (ref 21.81–274.66)
GFR SERPLBLD CREATININE-BSD FMLA CKD-EPI: >60 ML/MIN/1.73/M2
GLOBULIN SER-MCNC: 3 GM/DL (ref 2.4–3.5)
GLUCOSE SERPL-MCNC: 89 MG/DL (ref 74–100)
HBA1C MFR BLD: 4.6 %
HCT VFR BLD AUTO: 42.5 % (ref 42–52)
HDLC SERPL-MCNC: 63 MG/DL (ref 35–60)
HGB BLD-MCNC: 13.9 G/DL (ref 14–18)
IMM GRANULOCYTES # BLD AUTO: 0.01 X10(3)/MCL (ref 0–0.04)
IMM GRANULOCYTES NFR BLD AUTO: 0.2 %
IRON SATN MFR SERPL: 42 % (ref 20–50)
IRON SERPL-MCNC: 104 UG/DL (ref 65–175)
LDLC SERPL CALC-MCNC: 58 MG/DL (ref 50–140)
LYMPHOCYTES # BLD AUTO: 1.85 X10(3)/MCL (ref 0.6–4.6)
LYMPHOCYTES NFR BLD AUTO: 41.3 %
MCH RBC QN AUTO: 30.2 PG (ref 27–31)
MCHC RBC AUTO-ENTMCNC: 32.7 G/DL (ref 33–36)
MCV RBC AUTO: 92.4 FL (ref 80–94)
MONOCYTES # BLD AUTO: 0.42 X10(3)/MCL (ref 0.1–1.3)
MONOCYTES NFR BLD AUTO: 9.4 %
NEUTROPHILS # BLD AUTO: 1.82 X10(3)/MCL (ref 2.1–9.2)
NEUTROPHILS NFR BLD AUTO: 40.6 %
NRBC BLD AUTO-RTO: 0 %
PLATELET # BLD AUTO: 135 X10(3)/MCL (ref 130–400)
PMV BLD AUTO: 11.4 FL (ref 7.4–10.4)
POTASSIUM SERPL-SCNC: 3.7 MMOL/L (ref 3.5–5.1)
PROT SERPL-MCNC: 7.1 GM/DL (ref 6.4–8.3)
PSA SERPL-MCNC: 1.13 NG/ML
RBC # BLD AUTO: 4.6 X10(6)/MCL (ref 4.7–6.1)
SODIUM SERPL-SCNC: 140 MMOL/L (ref 136–145)
T4 FREE SERPL-MCNC: 0.98 NG/DL (ref 0.7–1.48)
TIBC SERPL-MCNC: 144 UG/DL (ref 69–240)
TIBC SERPL-MCNC: 248 UG/DL (ref 250–450)
TRANSFERRIN SERPL-MCNC: 222 MG/DL (ref 174–364)
TRIGL SERPL-MCNC: 39 MG/DL (ref 34–140)
TSH SERPL-ACNC: 1.14 UIU/ML (ref 0.35–4.94)
VLDLC SERPL CALC-MCNC: 8 MG/DL
WBC # BLD AUTO: 4.48 X10(3)/MCL (ref 4.5–11.5)

## 2024-07-09 PROCEDURE — 84443 ASSAY THYROID STIM HORMONE: CPT

## 2024-07-09 PROCEDURE — 80061 LIPID PANEL: CPT

## 2024-07-09 PROCEDURE — 84439 ASSAY OF FREE THYROXINE: CPT

## 2024-07-09 PROCEDURE — 82306 VITAMIN D 25 HYDROXY: CPT

## 2024-07-09 PROCEDURE — 85025 COMPLETE CBC W/AUTO DIFF WBC: CPT

## 2024-07-09 PROCEDURE — 36415 COLL VENOUS BLD VENIPUNCTURE: CPT

## 2024-07-09 PROCEDURE — 80053 COMPREHEN METABOLIC PANEL: CPT

## 2024-07-09 PROCEDURE — 83550 IRON BINDING TEST: CPT

## 2024-07-09 PROCEDURE — 82728 ASSAY OF FERRITIN: CPT

## 2024-07-09 PROCEDURE — 84153 ASSAY OF PSA TOTAL: CPT

## 2024-07-09 PROCEDURE — 83036 HEMOGLOBIN GLYCOSYLATED A1C: CPT

## 2024-07-26 ENCOUNTER — TELEPHONE (OUTPATIENT)
Dept: INTERNAL MEDICINE | Facility: CLINIC | Age: 39
End: 2024-07-26
Payer: MEDICAID

## 2024-07-31 ENCOUNTER — OFFICE VISIT (OUTPATIENT)
Dept: INTERNAL MEDICINE | Facility: CLINIC | Age: 39
End: 2024-07-31
Payer: MEDICAID

## 2024-07-31 VITALS
BODY MASS INDEX: 20.53 KG/M2 | DIASTOLIC BLOOD PRESSURE: 84 MMHG | HEART RATE: 88 BPM | HEIGHT: 74 IN | TEMPERATURE: 98 F | SYSTOLIC BLOOD PRESSURE: 107 MMHG | OXYGEN SATURATION: 97 % | WEIGHT: 160 LBS | RESPIRATION RATE: 20 BRPM

## 2024-07-31 DIAGNOSIS — Z00.00 WELL ADULT EXAM: Primary | ICD-10-CM

## 2024-07-31 DIAGNOSIS — Z13.0 SCREENING FOR IRON DEFICIENCY ANEMIA: ICD-10-CM

## 2024-07-31 DIAGNOSIS — Z13.220 SCREENING FOR LIPID DISORDERS: ICD-10-CM

## 2024-07-31 DIAGNOSIS — Z13.21 ENCOUNTER FOR VITAMIN DEFICIENCY SCREENING: ICD-10-CM

## 2024-07-31 DIAGNOSIS — Z12.5 SCREENING FOR PROSTATE CANCER: ICD-10-CM

## 2024-07-31 DIAGNOSIS — Z13.1 SCREENING FOR DIABETES MELLITUS: ICD-10-CM

## 2024-07-31 DIAGNOSIS — Z13.29 SCREENING FOR THYROID DISORDER: ICD-10-CM

## 2024-07-31 PROCEDURE — 1160F RVW MEDS BY RX/DR IN RCRD: CPT | Mod: CPTII,,,

## 2024-07-31 PROCEDURE — 3044F HG A1C LEVEL LT 7.0%: CPT | Mod: CPTII,,,

## 2024-07-31 PROCEDURE — 99215 OFFICE O/P EST HI 40 MIN: CPT | Mod: PBBFAC

## 2024-07-31 PROCEDURE — 3079F DIAST BP 80-89 MM HG: CPT | Mod: CPTII,,,

## 2024-07-31 PROCEDURE — 99395 PREV VISIT EST AGE 18-39: CPT | Mod: S$PBB,,,

## 2024-07-31 PROCEDURE — 3008F BODY MASS INDEX DOCD: CPT | Mod: CPTII,,,

## 2024-07-31 PROCEDURE — 3074F SYST BP LT 130 MM HG: CPT | Mod: CPTII,,,

## 2024-07-31 PROCEDURE — 1159F MED LIST DOCD IN RCRD: CPT | Mod: CPTII,,,

## 2024-07-31 NOTE — PROGRESS NOTES
PATIENT NAME: Davis Thomas  : 1985  DATE: 24  MRN: [id number]          Reason for Visit/Chief Complaint   Annual Exam and Results       History of Present Illness (HPI)     Davis Thomas is a 39 y.o. Black male presenting in clinic today for an Annual Exam and Results. PMH Marfan's syndrome, glaucoma, s/p bilateral corneal transplants. He is followed by [facility] - last office visit on 2024. He was referred to Dr. Alok Israel for bilat foot pain, but referral was declined, patient is to call insurance to inquire about in network podiatrists.     All pertinent labs dated 2024  reviewed and discussed with patient.     Denies cigarette use. Drinks alcohol occasionally. Former marijuana user. Denies chest pain, shortness of breath, cough, headache, dizziness, weakness, abdominal pain, nausea, vomiting, diarrhea, constipation, dysuria, depression, anxiety, SI, and HI.     Prostate Cancer Screening - Last PSA - 1.13 on 2024. Follow up annually.   Colon Cancer Screening - Deferred due to age.   Osteoporosis Screening - Deferred due to age.   Eye Exam - Followed by [facility] ophthalmology.  Dental Exam - Several years. List of local dentists given to patient.   Vaccinations: Flu - Not currently being offered, recommended annually. / Tetanus - 2024        Review of Systems     Review of Systems   Constitutional: Negative.    HENT: Negative.     Eyes: Negative.    Respiratory: Negative.     Cardiovascular: Negative.    Gastrointestinal: Negative.    Endocrine: Negative.    Genitourinary: Negative.    Musculoskeletal: Negative.         Bilat foot pain   Skin: Negative.    Allergic/Immunologic: Negative.    Neurological: Negative.    Hematological: Negative.    Psychiatric/Behavioral: Negative.     All other systems reviewed and are negative.      Medical / Social / Family History     Past Medical History:   Diagnosis Date    Keratoconus          Past Surgical History:  "  Procedure Laterality Date    CORNEAL TRANSPLANT Bilateral     REPAIR OF OPEN GLOBE Left 4/11/2024    Procedure: REPAIR, OPEN GLOBE;  Surgeon: Eric Shen MD;  Location: [facility];  Service: Ophthalmology;  Laterality: Left;  repair of ruptured globe and corneal transplant tissue to left eye         Social History  Davis Dunn  reports that he has never smoked. He has never been exposed to tobacco smoke. He has never used smokeless tobacco. He reports current alcohol use. He reports that he does not currently use drugs after having used the following drugs: Marijuana.    Family History  Davis Dunn family history is not on file.    Medications and Allergies     Medications  Current Outpatient Medications   Medication Instructions    atropine 1% (ISOPTO ATROPINE) 1 % Drop 1 drop, Left Eye, 2 times daily    brimonidine 0.2% (ALPHAGAN) 0.2 % Drop 1 drop, Left Eye, 3 times daily    ibuprofen (ADVIL,MOTRIN) 800 mg, Oral, 3 times daily PRN    prednisoLONE acetate (PRED FORTE) 1 % DrpS 1 drop, Left Eye, 6 times daily    timolol maleate 0.5% (TIMOPTIC) 0.5 % Drop 1 drop, Left Eye, 2 times daily    tobramycin sulfate 0.3% (TOBREX) 0.3 % ophthalmic solution Both Eyes       Allergies  Review of patient's allergies indicates:  No Known Allergies    Physical Examination   Visit Vitals  /84 (BP Location: Right arm, Patient Position: Sitting, BP Method: Small (Automatic))   Pulse 88   Temp 98.1 °F (36.7 °C) (Oral)   Resp 20   Ht 6' 2" (1.88 m)   Wt 72.6 kg (160 lb)   SpO2 97%   BMI 20.54 kg/m²     Physical Exam  Vitals reviewed.   Constitutional:       Appearance: Normal appearance. He is normal weight.   HENT:      Head: Normocephalic.      Nose: Nose normal.      Mouth/Throat:      Mouth: Mucous membranes are moist.      Pharynx: Oropharynx is clear.   Eyes:      Extraocular Movements: Extraocular movements intact.      Conjunctiva/sclera: Conjunctivae normal.      Pupils: Pupils are equal, round, and " reactive to light.   Cardiovascular:      Rate and Rhythm: Normal rate and regular rhythm.      Heart sounds: Normal heart sounds.   Pulmonary:      Effort: Pulmonary effort is normal.      Breath sounds: Normal breath sounds.   Abdominal:      General: Abdomen is flat. Bowel sounds are normal.      Palpations: Abdomen is soft.   Musculoskeletal:         General: Normal range of motion.      Cervical back: Normal range of motion.      Right foot: Bunion present.      Left foot: Bunion present.        Feet:    Skin:     General: Skin is warm and dry.      Capillary Refill: Capillary refill takes less than 2 seconds.   Neurological:      General: No focal deficit present.      Mental Status: He is alert and oriented to person, place, and time.   Psychiatric:         Mood and Affect: Mood normal.           Results     Lab Results   Component Value Date    WBC 4.48 (L) 07/09/2024    RBC 4.60 (L) 07/09/2024    HGB 13.9 (L) 07/09/2024    HCT 42.5 07/09/2024    MCV 92.4 07/09/2024    MCH 30.2 07/09/2024    MCHC 32.7 (L) 07/09/2024    RDW 11.8 07/09/2024     07/09/2024    MPV 11.4 (H) 07/09/2024      Lab Results   Component Value Date     07/09/2024    K 3.7 07/09/2024    CO2 28 07/09/2024    GLUCOSE 89 07/09/2024    BUN 6.7 (L) 07/09/2024    CREATININE 0.96 07/09/2024    LABPROT 7.1 07/09/2024    ALBUMIN 4.1 07/09/2024    BILITOT 0.7 07/09/2024    ALKPHOS 82 07/09/2024    AST 17 07/09/2024    ALT 15 07/09/2024    AGAP 7.0 07/09/2024    EGFRNORACEVR >60 07/09/2024     Lab Results   Component Value Date    TSH 1.135 07/09/2024     Lab Results   Component Value Date    CHOL 129 07/09/2024    HDL 63 (H) 07/09/2024    LDL 58.00 07/09/2024    TRIG 39 07/09/2024     Lab Results   Component Value Date    SGUA <1.005 (L) 11/22/2023    PROTEINUA Negative 11/22/2023    BILIRUBINUA Negative 11/22/2023    WBCUA 0-5 11/22/2023    RBCUA 0-5 11/22/2023    BACTERIA None Seen 11/22/2023    LEUKOCYTESUR Negative 11/22/2023     "UROBILINOGEN Normal 11/22/2023     No results found for: "CREATRANDUR", "MICALBCREAT"  Lab Results   Component Value Date    JELGKCWF22KE 34 07/09/2024     Lab Results   Component Value Date    HIV Nonreactive 11/22/2023    HEPAIGM Nonreactive 11/22/2023    HEPBSAG Nonreactive 11/22/2023    HEPCAB Nonreactive 11/22/2023     No results found for: "FITDIAG", "COLOGUARD"  No results found for: "OCCBLDIA"    Assessment and Plan (including Health Maintenance)     Problem List Items Addressed This Visit          Endocrine    BMI 20.0-20.9, adult    Current Assessment & Plan     Body mass index is 20.54 kg/m². (At goal).             Other    Well adult exam - Primary    Current Assessment & Plan     Wellness labs - 7/9/2024  Prostate Cancer Screening - Last PSA - 1.13 on 7/9/2024. Follow up annually.   Colon Cancer Screening - Deferred due to age.   Osteoporosis Screening - Deferred due to age.   Eye Exam - Followed by [facility] ophthalmology.  Dental Exam - Several years. List of local dentists given to patient.   Vaccinations: Flu - Not currently being offered, recommended annually. / Tetanus - 4/11/2024          Relevant Orders    Urinalysis, Reflex to Urine Culture    Microalbumin/Creatinine Ratio, Urine    Comprehensive Metabolic Panel    CBC Auto Differential     Other Visit Diagnoses       Encounter for vitamin deficiency screening        Relevant Orders    Vitamin D    Screening for thyroid disorder        Relevant Orders    TSH    T4, Free    Screening for prostate cancer        Relevant Orders    PSA, Screening    Screening for lipid disorders        Relevant Orders    Lipid Panel    Screening for iron deficiency anemia        Relevant Orders    Iron and TIBC    Ferritin    Screening for diabetes mellitus        Relevant Orders    Hemoglobin A1C               Health Maintenance Due   Topic Date Due    COVID-19 Vaccine (1 - 2023-24 season) Never done     All of your core healthy metrics are met.      Health Maintenance " Topics with due status: Not Due       Topic Last Completion Date    TETANUS VACCINE 04/11/2024    Lipid Panel 07/09/2024    Influenza Vaccine Not Due       Future Appointments   Date Time Provider Department Center   8/15/2024 10:00 AM [facility]    7/31/2025  9:00 AM [facility]         Follow up in about 1 year (around 7/31/2025) for F2F, Follow up, Wellness, RTC PRN, Lab review.          Signature:        JACQUELINE Negrete  [facility]  [address] [address]    Date of encounter: 7/31/24

## 2024-07-31 NOTE — PATIENT INSTRUCTIONS
REMINDER: Please complete labs within 1 week of appointment.   Please complete satisfaction survey when received. Thank you.    Archie Cannon,     If you are due for any health screening(s) below please notify me so we can arrange them to be ordered and scheduled. Most healthy patients at your age complete them, but you are free to accept or refuse.     If you can't do it, I'll definitely understand. If you can, I'd certainly appreciate it!    All of your core healthy metrics are met.

## 2024-08-03 NOTE — ASSESSMENT & PLAN NOTE
Wellness labs - 7/9/2024  Prostate Cancer Screening - Last PSA - 1.13 on 7/9/2024. Follow up annually.   Colon Cancer Screening - Deferred due to age.   Osteoporosis Screening - Deferred due to age.   Eye Exam - Followed by [facility] ophthalmology.  Dental Exam - Several years. List of local dentists given to patient.   Vaccinations: Flu - Not currently being offered, recommended annually. / Tetanus - 4/11/2024

## 2024-08-15 ENCOUNTER — OFFICE VISIT (OUTPATIENT)
Dept: OPHTHALMOLOGY | Facility: CLINIC | Age: 39
End: 2024-08-15
Payer: MEDICAID

## 2024-08-15 VITALS — BODY MASS INDEX: 20.54 KG/M2 | WEIGHT: 160.06 LBS | HEIGHT: 74 IN

## 2024-08-15 DIAGNOSIS — H18.603 KERATOCONUS OF BOTH EYES: ICD-10-CM

## 2024-08-15 DIAGNOSIS — Z94.7 STATUS POST CORNEAL TRANSPLANT: ICD-10-CM

## 2024-08-15 DIAGNOSIS — S05.32XD RUPTURED GLOBE, LEFT EYE, SUBSEQUENT ENCOUNTER: Primary | ICD-10-CM

## 2024-08-15 PROCEDURE — 99213 OFFICE O/P EST LOW 20 MIN: CPT | Mod: PBBFAC,PN

## 2024-08-15 NOTE — PROGRESS NOTES
HPI    RTC General clinic in 2mo for MRx and polycarbonate lenses  Last edited by Mikki Campbell MA on 8/15/2024  9:57 AM.            Assessment /Plan       B-scan:  - 4/18/24:  OS: dense hyaloid deposit suspended in vitreous? No gross lens fragments, retina flat  - 6/13/24:  OS: funnel RD, no masses.         1. Keratoconus OS   2. S/p open globe repair 4/11/24 OS with [facility]  - s/p DALK OS 1/2020  - s/p AK for astigmatism 5/21  - Wear protective glasses during the day at all times  - Worsening astig, discussed that pt would likely not benefit from repeat AK at this point. Patient has thick cornea and would likely benefit from PRK however unable to afford as noted below  - Mrx given 12/2023, patient never got glasses due to unable to afford them  - 12/7/23: using PF daily still, decrease to once weekly, drops refilled; states vision is getting worse. Topography showed worsening astigmatism. Discussed with patient hard contact lens vs PRK, patient unable to afford now but is waiting for disability (still).  RTC PRN for PRK logistics if/when interested  - Open Globe (4/11/24): Dehisced DALK with rupture of Descemet's? [facility] noted patient had essentially open emely cornea. NLP at time of injury, LP on 4/18/24 exam  - 4/26/24: [facility] ok to have us follow. Will recheck in 2 weeks and can discuss bringing to [facility] clinic day for further eval if he would recommend PPV in future.  - 5/10/24: vision stable, IOP good. Tyrone negative. Cornea diffusely edematous with corneal blood staining. Will bring back in 2 weeks in [place] [facility] for possible PKP eval.   - Medications:  PF 6x/day - 4-3-2-1 taper  Atropine BID - go down to 1x/day  Timolol BID - stop  Brimonidine BID- stop  - B-scan OS with Funnel RD, poor vision potential discussed with patient.  - Comfort care recommended, per Dr. Blanco. RTC General for MRx and Polycarbonate lenses.  - 8/14/24: Mrx provided with Polycarbonate, patient unlikely to be able to afford.       3.  Keratoconus OD  - S/p PKP (11/6/14)  - S/p AK 10/7/19  - Cornea has clear central PKP graft  - continue PF once weekly; drops refilled  - Patient indicated that there were barriers to using contact lenses, including affordability and difficulty placing them  - Per Dr. Blanco can consider PRK to reduce astigmatism. Spoke with Dr. Alonso's office, cost would be $250 for standard.  - 3/14/24: no change on exam, grafts look great.  Can continue once weekly PF and be seen when he is ready for PRK  - 8/14/24: Exam unchanged, BCVA 20/60. Graft appears great. Will have patient re-examined by Dr. Blanco for potential repeat AK (would be covered by insurance).    - RTC in 2 months for Dr. Francis rios       3. Open angle with borderline findings, OU  - (+) Fhx  - Gonio 10/2018: open to  OU, light pigmentation  - Large nerves, 0.6  - Baseline HVF 10/2018 unreliable, likely due to irregular astigmatism (will follow with OCT)  - OCT RNFL all green  - HVF without glaucaomatous changes  - IOP wnl      4. MGD OU  - Start WC/LS/ATs 4-6 times/day (handout given)      5. Marfan's Syndrome  - seen by PCP with workup initiated  - echo done  - no obvious lens subluxation on exam            RTC 2 months for Dr. Francis Rios + eval possible repeat AK OD

## 2024-10-17 DIAGNOSIS — Z94.7 STATUS POST CORNEAL TRANSPLANT: ICD-10-CM

## 2024-10-17 RX ORDER — ATROPINE SULFATE 10 MG/ML
1 SOLUTION/ DROPS OPHTHALMIC 2 TIMES DAILY
Qty: 5 ML | Refills: 11 | Status: SHIPPED | OUTPATIENT
Start: 2024-10-17

## 2024-10-17 RX ORDER — PREDNISOLONE ACETATE 10 MG/ML
1 SUSPENSION/ DROPS OPHTHALMIC
Qty: 10 ML | Refills: 2 | Status: SHIPPED | OUTPATIENT
Start: 2024-10-17

## 2024-10-17 NOTE — TELEPHONE ENCOUNTER
----- Message from Seun sent at 10/17/2024 10:21 AM CDT -----  Patient mom called stated her son need refills on eyedrops (pink top&red top) she ask for them both to be sent over to [facility] pharmacy on [facility]

## 2024-10-24 ENCOUNTER — OFFICE VISIT (OUTPATIENT)
Dept: OPHTHALMOLOGY | Facility: CLINIC | Age: 39
End: 2024-10-24
Payer: MEDICAID

## 2024-10-24 VITALS — WEIGHT: 160.06 LBS | BODY MASS INDEX: 20.54 KG/M2 | HEIGHT: 74 IN

## 2024-10-24 DIAGNOSIS — H18.603 KERATOCONUS OF BOTH EYES: Primary | ICD-10-CM

## 2024-10-24 DIAGNOSIS — S05.32XD RUPTURED GLOBE, LEFT EYE, SUBSEQUENT ENCOUNTER: ICD-10-CM

## 2024-10-24 PROCEDURE — 99212 OFFICE O/P EST SF 10 MIN: CPT | Mod: PBBFAC,PN

## 2024-10-24 RX ORDER — ERYTHROMYCIN 5 MG/G
OINTMENT OPHTHALMIC 3 TIMES DAILY
Qty: 3.5 G | Refills: 1 | Status: SHIPPED | OUTPATIENT
Start: 2024-10-24

## 2024-10-24 NOTE — PROGRESS NOTES
[facility]    RTC in 2 months for Dr. Blanco - nicko   Last edited by Alda Hughes MA on 10/24/2024 12:23 PM.            Assessment /Plan     For exam results, see Encounter Report.    There are no diagnoses linked to this encounter.      B-scan:  - 4/18/24:  OS: dense hyaloid deposit suspended in vitreous? No gross lens fragments, retina flat  - 6/13/24:  OS: funnel RD, no masses.         1. Keratoconus OS   2. S/p open globe repair 4/11/24 OS with [facility]  - s/p DALK OS 1/2020  - s/p AK for astigmatism 5/21  - Wear protective glasses during the day at all times  - Worsening astig, discussed that pt would likely not benefit from repeat AK at this point. Patient has thick cornea and would likely benefit from PRK however unable to afford as noted below  - Mrx given 12/2023, patient never got glasses due to unable to afford them  - 12/7/23: using PF daily still, decrease to once weekly, drops refilled; states vision is getting worse. Topography showed worsening astigmatism. Discussed with patient hard contact lens vs PRK, patient unable to afford now but is waiting for disability (still).  RTC PRN for PRK logistics if/when interested  - Open Globe (4/11/24): Dehisced DALK with rupture of Descemet's? [facility] noted patient had essentially open emely cornea. NLP at time of injury, LP on 4/18/24 exam  - 4/26/24: [facility] ok to have us follow. Will recheck in 2 weeks and can discuss bringing to [facility] clinic day for further eval if he would recommend PPV in future.  - 5/10/24: vision stable, IOP good. Tyrone negative. Cornea diffusely edematous with corneal blood staining. Will bring back in 2 weeks in [facility] for possible PKP eval.   - Medications:  PF 6x/day - 4-3-2-1 taper  Atropine BID - go down to 1x/day  Timolol BID - stop  Brimonidine BID- stop  - B-scan OS with Funnel RD, poor vision potential discussed with patient.  - Comfort care recommended, per Dr. Blanco. RTC General for MRx and Polycarbonate lenses.  - 10/24/24:  having some irritation. Attempted suture removal for 3 sutures with exposed tails. Suture at 5 o'clock cut and removed. Sutures at 9 and 12 o'clock cut, but unable to be removed with short tails remaining. Will let them epithelialize. Use erythromycin ointment TID for 1 week.     3. Keratoconus OD  - S/p PKP (11/6/14)  - S/p AK 10/7/19  - Cornea has clear central PKP graft  - continue PF once weekly; drops refilled  - Patient indicated that there were barriers to using contact lenses, including affordability and difficulty placing them  - Per Dr. Blanco can consider PRK to reduce astigmatism. Spoke with Dr. Alonso's office, cost would be $250 for standard.  - 3/14/24: no change on exam, grafts look great.  Can continue once weekly PF and be seen when he is ready for PRK  - 8/14/24: Exam unchanged, BCVA 20/60. Graft appears great. Will have patient re-examined by Dr. Blanco for potential repeat AK (would be covered by insurance).   - 10/24/24: Vision stable. Graft appears good. Discussed with Dr. Blanco, not worth the risk for surgery given monocular status, minimal astigmatism that can be corrected with glasses.   - RTC in 4 months general clinic      3. Open angle with borderline findings, OU  - (+) Fhx  - Gonio 10/2018: open to  OU, light pigmentation  - Large nerves, 0.6  - Baseline HVF 10/2018 unreliable, likely due to irregular astigmatism (will follow with OCT)  - OCT RNFL all green  - HVF without glaucaomatous changes  - IOP wnl      4. MGD OU  - Start WC/LS/ATs 4-6 times/day (handout given)      5. Marfan's Syndrome  - seen by PCP with workup initiated  - echo done  - no obvious lens subluxation on exam            RTC 4 months general clinic, sooner prn

## 2024-12-06 ENCOUNTER — TELEPHONE (OUTPATIENT)
Dept: OPHTHALMOLOGY | Facility: CLINIC | Age: 39
End: 2024-12-06
Payer: MEDICAID

## 2024-12-06 DIAGNOSIS — Z94.7 STATUS POST CORNEAL TRANSPLANT: ICD-10-CM

## 2024-12-06 RX ORDER — ATROPINE SULFATE 10 MG/ML
1 SOLUTION/ DROPS OPHTHALMIC 2 TIMES DAILY
Qty: 5 ML | Refills: 11 | Status: SHIPPED | OUTPATIENT
Start: 2024-12-06

## 2025-02-27 ENCOUNTER — OFFICE VISIT (OUTPATIENT)
Dept: OPHTHALMOLOGY | Facility: CLINIC | Age: 40
End: 2025-02-27
Payer: MEDICAID

## 2025-02-27 VITALS — BODY MASS INDEX: 20.65 KG/M2 | HEIGHT: 74 IN | WEIGHT: 160.94 LBS

## 2025-02-27 DIAGNOSIS — Z94.7 STATUS POST CORNEAL TRANSPLANT: ICD-10-CM

## 2025-02-27 DIAGNOSIS — S05.32XD RUPTURED GLOBE, LEFT EYE, SUBSEQUENT ENCOUNTER: Primary | ICD-10-CM

## 2025-02-27 PROCEDURE — 99212 OFFICE O/P EST SF 10 MIN: CPT | Mod: PBBFAC,PN

## 2025-02-27 RX ORDER — PREDNISOLONE ACETATE 10 MG/ML
1 SUSPENSION/ DROPS OPHTHALMIC DAILY
Qty: 10 ML | Refills: 5 | Status: SHIPPED | OUTPATIENT
Start: 2025-02-27

## 2025-02-27 NOTE — PROGRESS NOTES
HPI     - RTC in 4 months general clinic    Patient request refills on Pred Forte  Last edited by Narcisa Brice, RN on 2/27/2025 12:55 PM.          Assessment /Plan     For exam results, see Encounter Report.    Ruptured globe, left eye, subsequent encounter    Status post corneal transplant  -     prednisoLONE acetate (PRED FORTE) 1 % DrpS; Place 1 drop into the left eye once daily.  Dispense: 10 mL; Refill: 5        B-scan:  - 4/18/24:  OS: dense hyaloid deposit suspended in vitreous? No gross lens fragments, retina flat  - 6/13/24:  OS: funnel RD, no masses.     1. Keratoconus OS   2. S/p open globe repair 4/11/24 OS with [facility]  - s/p DALK OS 1/2020  - s/p AK for astigmatism 5/21  - Wear protective glasses during the day at all times  - Worsening astig, discussed that pt would likely not benefit from repeat AK at this point. Patient has thick cornea and would likely benefit from PRK however unable to afford as noted below  - Mrx given 12/2023, patient never got glasses due to unable to afford them  - 12/7/23: using PF daily still, decrease to once weekly, drops refilled; states vision is getting worse. Topography showed worsening astigmatism. Discussed with patient hard contact lens vs PRK, patient unable to afford now but is waiting for disability (still).  RTC PRN for PRK logistics if/when interested  - Open Globe (4/11/24): Dehisced DALK with rupture of Descemet's? [facility] noted patient had essentially open emely cornea. NLP at time of injury, LP on 4/18/24 exam  - 4/26/24: [facility] ok to have us follow. Will recheck in 2 weeks and can discuss bringing to [facility] clinic day for further eval if he would recommend PPV in future.  - 5/10/24: vision stable, IOP good. Tyrone negative. Cornea diffusely edematous with corneal blood staining. Will bring back in 2 weeks in [facility] for possible PKP eval.   - Medications:  PF 6x/day - 4-3-2-1 taper  Atropine BID - go down to 1x/day  Timolol BID - stop  Brimonidine BID- stop  -  B-scan OS with Funnel RD, poor vision potential discussed with patient.  - Comfort care recommended, per Dr. Blanco. RTC General for MRx and Polycarbonate lenses.  - 10/24/24: having some irritation. Attempted suture removal for 3 sutures with exposed tails. Suture at 5 o'clock cut and removed. Sutures at 9 and 12 o'clock cut, but unable to be removed with short tails remaining. Will let them epithelialize. Use erythromycin ointment TID for 1 week.   - 2/27/25: Exposed tails appear to have re-epithelialized. No loose sutures seen on fluorescein. Patient with some irritation but reports it improves with eye drops. Currently on PF daily. Will continue PF for comfort care    3. Keratoconus OD  - S/p PKP (11/6/14)  - S/p AK 10/7/19  - Cornea has clear central PKP graft  - continue PF once weekly; drops refilled  - Patient indicated that there were barriers to using contact lenses, including affordability and difficulty placing them  - Per Dr. Blanco can consider PRK to reduce astigmatism. Spoke with Dr. Alonso's office, cost would be $250 for standard.  - 3/14/24: no change on exam, grafts look great.  Can continue once weekly PF and be seen when he is ready for PRK  - 8/14/24: Exam unchanged, BCVA 20/60. Graft appears great. Will have patient re-examined by Dr. Blanco for potential repeat AK (would be covered by insurance).   - 10/24/24: Vision stable. Graft appears good. Discussed with Dr. Blanco, not worth the risk for surgery given monocular status, minimal astigmatism that can be corrected with glasses.   - 2/27/25: Vision stable.     3. Open angle with borderline findings, OU  - (+) Fhx  - Gonio 10/2018: open to  OU, light pigmentation  - Large nerves, 0.6  - Baseline HVF 10/2018 unreliable, likely due to irregular astigmatism (will follow with OCT)  - OCT RNFL all green  - HVF without glaucaomatous changes  - IOP wnl    4. MGD OU  - Start WC/LS/ATs 4-6 times/day (handout given)    5. Marfan's  Syndrome  - seen by PCP with workup initiated  - echo done  - no obvious lens subluxation on exam    RTC 3-4 months general clinic for DFE and AC check

## 2025-03-18 ENCOUNTER — OFFICE VISIT (OUTPATIENT)
Dept: URGENT CARE | Facility: CLINIC | Age: 40
End: 2025-03-18
Payer: MEDICAID

## 2025-03-18 ENCOUNTER — HOSPITAL ENCOUNTER (OUTPATIENT)
Dept: RADIOLOGY | Facility: HOSPITAL | Age: 40
Discharge: HOME | End: 2025-03-18
Payer: MEDICAID

## 2025-03-18 VITALS
BODY MASS INDEX: 19.89 KG/M2 | RESPIRATION RATE: 18 BRPM | DIASTOLIC BLOOD PRESSURE: 84 MMHG | WEIGHT: 155 LBS | SYSTOLIC BLOOD PRESSURE: 121 MMHG | OXYGEN SATURATION: 97 % | HEIGHT: 74 IN | TEMPERATURE: 98 F | HEART RATE: 63 BPM

## 2025-03-18 DIAGNOSIS — M79.641 RIGHT HAND PAIN: ICD-10-CM

## 2025-03-18 DIAGNOSIS — S62.667A CLOSED NONDISPLACED FRACTURE OF DISTAL PHALANX OF LEFT LITTLE FINGER, INITIAL ENCOUNTER: Primary | ICD-10-CM

## 2025-03-18 DIAGNOSIS — L08.9 LOCALIZED BACTERIAL SKIN INFECTION: ICD-10-CM

## 2025-03-18 DIAGNOSIS — T14.8XXA ABRASION: ICD-10-CM

## 2025-03-18 DIAGNOSIS — B96.89 LOCALIZED BACTERIAL SKIN INFECTION: ICD-10-CM

## 2025-03-18 DIAGNOSIS — R22.32 LOCALIZED SWELLING OF FINGER OF LEFT HAND: ICD-10-CM

## 2025-03-18 DIAGNOSIS — M79.642 LEFT HAND PAIN: ICD-10-CM

## 2025-03-18 DIAGNOSIS — Z76.89 REFERRAL OF PATIENT: ICD-10-CM

## 2025-03-18 PROCEDURE — 73130 X-RAY EXAM OF HAND: CPT | Mod: TC,RT

## 2025-03-18 PROCEDURE — 99215 OFFICE O/P EST HI 40 MIN: CPT | Mod: PBBFAC,25

## 2025-03-18 PROCEDURE — 99215 OFFICE O/P EST HI 40 MIN: CPT | Mod: S$PBB,,,

## 2025-03-18 PROCEDURE — 73130 X-RAY EXAM OF HAND: CPT | Mod: TC,LT

## 2025-03-18 PROCEDURE — 73140 X-RAY EXAM OF FINGER(S): CPT | Mod: TC,LT

## 2025-03-18 RX ORDER — CEFADROXIL 500 MG/1
500 CAPSULE ORAL EVERY 12 HOURS
Qty: 10 CAPSULE | Refills: 0 | Status: SHIPPED | OUTPATIENT
Start: 2025-03-18 | End: 2025-03-23

## 2025-03-18 RX ORDER — IBUPROFEN 600 MG/1
600 TABLET ORAL EVERY 8 HOURS PRN
Qty: 15 TABLET | Refills: 0 | Status: SHIPPED | OUTPATIENT
Start: 2025-03-18 | End: 2025-03-23

## 2025-03-18 RX ORDER — MUPIROCIN 20 MG/G
OINTMENT TOPICAL 2 TIMES DAILY
Qty: 22 G | Refills: 0 | Status: SHIPPED | OUTPATIENT
Start: 2025-03-18 | End: 2025-03-25

## 2025-03-18 NOTE — PATIENT INSTRUCTIONS
Rest. Take medication as prescribed.  Apply ice to affected area 2 to 3 times a day for 5-10 minutes. Follow up with PCP in 1 week if pain persists. Go to the nearest ER for worsening symptoms, such as severe pain, chest pain, shortness of breath, palpitations, etc.
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
## 2025-03-24 ENCOUNTER — ANESTHESIA EVENT (OUTPATIENT)
Dept: SURGERY | Facility: HOSPITAL | Age: 40
End: 2025-03-24
Payer: MEDICAID

## 2025-03-24 ENCOUNTER — OFFICE VISIT (OUTPATIENT)
Dept: ORTHOPEDICS | Facility: CLINIC | Age: 40
End: 2025-03-24
Payer: MEDICAID

## 2025-03-24 ENCOUNTER — HOSPITAL ENCOUNTER (OUTPATIENT)
Dept: RADIOLOGY | Facility: HOSPITAL | Age: 40
Discharge: HOME | End: 2025-03-24
Payer: MEDICAID

## 2025-03-24 VITALS
BODY MASS INDEX: 19.69 KG/M2 | TEMPERATURE: 98 F | HEIGHT: 74 IN | RESPIRATION RATE: 20 BRPM | DIASTOLIC BLOOD PRESSURE: 79 MMHG | WEIGHT: 153.44 LBS | HEART RATE: 69 BPM | SYSTOLIC BLOOD PRESSURE: 123 MMHG | OXYGEN SATURATION: 99 %

## 2025-03-24 DIAGNOSIS — M79.645 FINGER PAIN, LEFT: ICD-10-CM

## 2025-03-24 DIAGNOSIS — M79.645 FINGER PAIN, LEFT: Primary | ICD-10-CM

## 2025-03-24 DIAGNOSIS — S62.637A CLOSED DISPLACED FRACTURE OF DISTAL PHALANX OF LEFT LITTLE FINGER, INITIAL ENCOUNTER: ICD-10-CM

## 2025-03-24 PROCEDURE — 73140 X-RAY EXAM OF FINGER(S): CPT | Mod: TC,LT

## 2025-03-24 PROCEDURE — 99215 OFFICE O/P EST HI 40 MIN: CPT | Mod: PBBFAC,25

## 2025-03-24 RX ORDER — CEFAZOLIN SODIUM 2 G/50ML
2 SOLUTION INTRAVENOUS
OUTPATIENT
Start: 2025-03-24

## 2025-03-24 RX ORDER — MUPIROCIN 20 MG/G
OINTMENT TOPICAL
OUTPATIENT
Start: 2025-03-24
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
## 2025-03-24 NOTE — PROGRESS NOTES
Faculty Attestation: Davis Thomas  was seen at [facility] in the Orthopaedic Clinic. Patient seen and evaluated at the time of the visit. History of Present Illness, Physical Exam, and Assessment and Plan reviewed. Treatment plan is reasonable and appropriate. Compliance with treatment recommendations is important. No procedure was performed.     Nicho Gamboa MD  Orthopaedic Surgery

## 2025-03-31 NOTE — H&P
Orthopedic H&P Note    Patient is 40M indicated for LEFT small finger distal phalanx percutaneous pinning.     I have seen and examined the patient and there are no changes since her last clinic visit.    Patient is marked consented and all questions answered.    Oswaldo Conner MD  [employer] [facility] Surgery PGY-3

## 2025-04-02 PROBLEM — S62.637A CLOSED DISPLACED FRACTURE OF DISTAL PHALANX OF LEFT LITTLE FINGER: Status: ACTIVE | Noted: 2025-04-02

## 2025-04-02 NOTE — OP NOTE
Orthopedic Surgery Operative Note     Date of Service: 04/02/2025     Pre-Operative Diagnosis: LEFT Small Finger Distal Phalanx Fracture    Post-Operative Diagnosis: Same     Procedure(s):   1. CRPP Left Small Finger Distal Phalanx     Anesthesia: LMA    Surgeon:   Oswaldo Conner MD    Indications   Patient is a 40 y.o.male with left small finger distal phalanx fx. The patient was checked again in the Holding Room. The risks, benefits, complications, treatment options, and expected outcomes were reviewed again with the patient. The patient has elected to proceed with left small finger pinning. The risks and potential complications include but are not limited to infection, nerve injury, vascular injury, persistent pain, potential skin necrosis, deep vein thrombosis, possible pulmonary embolus, complications of the anesthetics and failure of the implants with potential need for future surgery to remove or revise. The patient concurred with the proposed plan, giving informed consent. The site of surgery was identified by the patient and properly noted/marked by me.     Implant:   0.045mm K wire    Procedure Details:   The patient was taken to the operating room. A Time-Out was held and the patient, procedure and laterality were verified and agreed upon by all members of the operating room staff. Prophylactic antibiotics were given.The patient was given general anesthesia.  The extremity was sterilized with alcohol followed by chlorhexidine solution and draped in standard fashion.    The fracture line was visualized using live intraoperative fluoroscopy.  We manually reduced the fracture.  At that point using the assistance of our fluoroscopy we placed a single 4 5 Daquan wire from the distal tip of the distal phalanx retrograde back through the DIPJ joint and then into the intramedullary substance of the middle phalanx.  We inspected our length alignment and rotation under fluoro and determined the fracture to be  appropriately aligned and secured.  We then dressed the pin site with Xeroform and bend and cut the Daquan wire outside of the skin before capping it with a blunt cap.  The digit was then dressed with 4 x 4 gauze and placed in a aluminum splint and wrapped with Kamran.    Findings:  Distal phalanx fracture     Estimated blood loss:  None     Drains:  None     Total IV fluids: Per anesthesia records     Specimens:  None     Complications: None, pt tolerated the procedure well     Disposition: Awakened from anesthesia, and taken to the recovery room in a stable condition, having suffered no apparent untoward event     Condition: doing well without problems     Post-Operative Management   - No lifting extremity  - may use other digits  - maintain splint until clinic  - pin should remain 2 weeks    Oswaldo Conner MD  [employer] Orthopedic Surgery PGY-3

## 2025-04-02 NOTE — DISCHARGE SUMMARY
[facility]  Discharge Note  Short Stay    Procedure(s) (LRB):  CLOSED REDUCTION, FINGER, WITH PINNING (Left)    OUTCOME: Patient tolerated treatment/procedure well without complication and is now ready for discharge.    DISPOSITION: Home or Self Care    FINAL DIAGNOSIS:  Small Finger Distal Phalanx Fracture    FOLLOWUP: In clinic    DISCHARGE INSTRUCTIONS:    Discharge Procedure Orders   Lifting restrictions   Order Comments: No lifting with hand, maintain splint     Leave dressing on - Keep it clean, dry, and intact until clinic visit        TIME SPENT ON DISCHARGE: 10 minutes    Oswaldo Conner MD  [employer] Orthopedic Surgery PGY-3

## 2025-04-02 NOTE — DISCHARGE INSTRUCTIONS
Maintain splint until clinic  Make sure splint remains dry  No lifting with the hand until pin is removed  Encouraged moving the rest of the fingers in the hand    · Keep follow up appointment at [facility].    · Take pain medication as prescribed.    · Keep arm elevated on pillow.    · No driving or consuming alcohol for the next 24 hours or while taking narcotic pain medicine.    · May apply ice pack to surgical area for 20 minutes at a time 6-8 times per day.    · Dressing: Leave clean and dry until follow up appointment.    · NO LIFTING OR PULLING WITH AFFECTED ARM until cleared by MD.    · Notify MD of any moderate to severe pain unrelieved by pain medicine or for any signs of infection including fever above 100.4, excessive redness or swelling, yellow/green foul- smelling drainage, nausea or vomiting. Call clinic at: [phone]. After business hours, if you are unable to reach a doctor on call at [phone] or your concern is an emergency, call 911 or report to your nearest emergency room.    ·  Thanks for choosing [facility]! Have a smooth recovery!

## 2025-04-03 ENCOUNTER — HOSPITAL ENCOUNTER (OUTPATIENT)
Facility: HOSPITAL | Age: 40
Discharge: HOME | End: 2025-04-03
Attending: ORTHOPAEDIC SURGERY | Admitting: ORTHOPAEDIC SURGERY
Payer: MEDICAID

## 2025-04-03 ENCOUNTER — ANESTHESIA (OUTPATIENT)
Dept: SURGERY | Facility: HOSPITAL | Age: 40
End: 2025-04-03
Payer: MEDICAID

## 2025-04-03 VITALS
TEMPERATURE: 97 F | WEIGHT: 156 LBS | OXYGEN SATURATION: 100 % | DIASTOLIC BLOOD PRESSURE: 85 MMHG | BODY MASS INDEX: 20.03 KG/M2 | HEART RATE: 79 BPM | SYSTOLIC BLOOD PRESSURE: 138 MMHG | RESPIRATION RATE: 16 BRPM

## 2025-04-03 DIAGNOSIS — M79.645 FINGER PAIN, LEFT: ICD-10-CM

## 2025-04-03 DIAGNOSIS — S62.637A CLOSED DISPLACED FRACTURE OF DISTAL PHALANX OF LEFT LITTLE FINGER, INITIAL ENCOUNTER: ICD-10-CM

## 2025-04-03 DIAGNOSIS — S62.637D CLOSED DISPLACED FRACTURE OF DISTAL PHALANX OF LEFT LITTLE FINGER WITH ROUTINE HEALING, SUBSEQUENT ENCOUNTER: Primary | ICD-10-CM

## 2025-04-03 PROCEDURE — 37000008 HC ANESTHESIA 1ST 15 MINUTES: Performed by: ORTHOPAEDIC SURGERY

## 2025-04-03 PROCEDURE — 71000033 HC RECOVERY, INTIAL HOUR: Performed by: ORTHOPAEDIC SURGERY

## 2025-04-03 PROCEDURE — 63600175 PHARM REV CODE 636 W HCPCS: Performed by: NURSE ANESTHETIST, CERTIFIED REGISTERED

## 2025-04-03 PROCEDURE — C1713 ANCHOR/SCREW BN/BN,TIS/BN: HCPCS | Performed by: ORTHOPAEDIC SURGERY

## 2025-04-03 PROCEDURE — 71000015 HC POSTOP RECOV 1ST HR: Performed by: ORTHOPAEDIC SURGERY

## 2025-04-03 PROCEDURE — 36000706: Performed by: ORTHOPAEDIC SURGERY

## 2025-04-03 PROCEDURE — 36000707: Performed by: ORTHOPAEDIC SURGERY

## 2025-04-03 PROCEDURE — 25000003 PHARM REV CODE 250: Performed by: NURSE ANESTHETIST, CERTIFIED REGISTERED

## 2025-04-03 PROCEDURE — 37000009 HC ANESTHESIA EA ADD 15 MINS: Performed by: ORTHOPAEDIC SURGERY

## 2025-04-03 PROCEDURE — 63600175 PHARM REV CODE 636 W HCPCS: Performed by: ANESTHESIOLOGY

## 2025-04-03 PROCEDURE — 63600175 PHARM REV CODE 636 W HCPCS: Performed by: SPECIALIST

## 2025-04-03 RX ORDER — HYDROMORPHONE HYDROCHLORIDE 1 MG/ML
0.5 INJECTION, SOLUTION INTRAMUSCULAR; INTRAVENOUS; SUBCUTANEOUS EVERY 5 MIN PRN
Status: DISCONTINUED | OUTPATIENT
Start: 2025-04-03 | End: 2025-04-03 | Stop reason: HOSPADM

## 2025-04-03 RX ORDER — CEFAZOLIN SODIUM 1 G/3ML
2 INJECTION, POWDER, FOR SOLUTION INTRAMUSCULAR; INTRAVENOUS
Status: DISCONTINUED | OUTPATIENT
Start: 2025-04-03 | End: 2025-04-03 | Stop reason: HOSPADM

## 2025-04-03 RX ORDER — GLUCAGON 1 MG
1 KIT INJECTION
Status: DISCONTINUED | OUTPATIENT
Start: 2025-04-03 | End: 2025-04-03 | Stop reason: HOSPADM

## 2025-04-03 RX ORDER — IPRATROPIUM BROMIDE AND ALBUTEROL SULFATE 2.5; .5 MG/3ML; MG/3ML
3 SOLUTION RESPIRATORY (INHALATION) ONCE AS NEEDED
Status: DISCONTINUED | OUTPATIENT
Start: 2025-04-03 | End: 2025-04-03 | Stop reason: HOSPADM

## 2025-04-03 RX ORDER — ONDANSETRON HYDROCHLORIDE 2 MG/ML
INJECTION, SOLUTION INTRAMUSCULAR; INTRAVENOUS
Status: DISCONTINUED | OUTPATIENT
Start: 2025-04-03 | End: 2025-04-03

## 2025-04-03 RX ORDER — PROPOFOL 10 MG/ML
VIAL (ML) INTRAVENOUS
Status: DISCONTINUED | OUTPATIENT
Start: 2025-04-03 | End: 2025-04-03

## 2025-04-03 RX ORDER — LIDOCAINE HYDROCHLORIDE 20 MG/ML
INJECTION, SOLUTION EPIDURAL; INFILTRATION; INTRACAUDAL; PERINEURAL
Status: DISCONTINUED | OUTPATIENT
Start: 2025-04-03 | End: 2025-04-03

## 2025-04-03 RX ORDER — MIDAZOLAM HYDROCHLORIDE 2 MG/2ML
2 INJECTION, SOLUTION INTRAMUSCULAR; INTRAVENOUS ONCE AS NEEDED
Status: COMPLETED | OUTPATIENT
Start: 2025-04-04 | End: 2025-04-03

## 2025-04-03 RX ORDER — KETAMINE HCL IN 0.9 % NACL 50 MG/5 ML
SYRINGE (ML) INTRAVENOUS
Status: DISCONTINUED | OUTPATIENT
Start: 2025-04-03 | End: 2025-04-03

## 2025-04-03 RX ORDER — NAPROXEN 500 MG/1
500 TABLET ORAL 2 TIMES DAILY
Qty: 28 TABLET | Refills: 0 | Status: SHIPPED | OUTPATIENT
Start: 2025-04-03

## 2025-04-03 RX ORDER — MEPERIDINE HYDROCHLORIDE 25 MG/ML
12.5 INJECTION INTRAMUSCULAR; INTRAVENOUS; SUBCUTANEOUS ONCE
Status: DISCONTINUED | OUTPATIENT
Start: 2025-04-03 | End: 2025-04-03 | Stop reason: HOSPADM

## 2025-04-03 RX ORDER — ONDANSETRON HYDROCHLORIDE 2 MG/ML
4 INJECTION, SOLUTION INTRAVENOUS ONCE
Status: DISCONTINUED | OUTPATIENT
Start: 2025-04-03 | End: 2025-04-03 | Stop reason: HOSPADM

## 2025-04-03 RX ORDER — MUPIROCIN 20 MG/G
OINTMENT TOPICAL
Status: DISCONTINUED | OUTPATIENT
Start: 2025-04-03 | End: 2025-04-03 | Stop reason: HOSPADM

## 2025-04-03 RX ORDER — CEFAZOLIN 2 G/1
INJECTION, POWDER, FOR SOLUTION INTRAMUSCULAR; INTRAVENOUS
Status: DISCONTINUED | OUTPATIENT
Start: 2025-04-03 | End: 2025-04-03

## 2025-04-03 RX ORDER — HYDROMORPHONE HYDROCHLORIDE 1 MG/ML
0.2 INJECTION, SOLUTION INTRAMUSCULAR; INTRAVENOUS; SUBCUTANEOUS EVERY 5 MIN PRN
Status: DISCONTINUED | OUTPATIENT
Start: 2025-04-03 | End: 2025-04-03 | Stop reason: HOSPADM

## 2025-04-03 RX ORDER — DIPHENHYDRAMINE HYDROCHLORIDE 50 MG/ML
25 INJECTION, SOLUTION INTRAMUSCULAR; INTRAVENOUS ONCE AS NEEDED
Status: DISCONTINUED | OUTPATIENT
Start: 2025-04-03 | End: 2025-04-03 | Stop reason: HOSPADM

## 2025-04-03 RX ORDER — PROCHLORPERAZINE EDISYLATE 5 MG/ML
5 INJECTION INTRAMUSCULAR; INTRAVENOUS ONCE AS NEEDED
Status: DISCONTINUED | OUTPATIENT
Start: 2025-04-03 | End: 2025-04-03 | Stop reason: HOSPADM

## 2025-04-03 RX ORDER — HYDROCODONE BITARTRATE AND ACETAMINOPHEN 7.5; 325 MG/1; MG/1
1 TABLET ORAL EVERY 6 HOURS PRN
Qty: 10 TABLET | Refills: 0 | Status: SHIPPED | OUTPATIENT
Start: 2025-04-03

## 2025-04-03 RX ORDER — DEXAMETHASONE SODIUM PHOSPHATE 4 MG/ML
INJECTION, SOLUTION INTRA-ARTICULAR; INTRALESIONAL; INTRAMUSCULAR; INTRAVENOUS; SOFT TISSUE
Status: DISCONTINUED | OUTPATIENT
Start: 2025-04-03 | End: 2025-04-03

## 2025-04-03 RX ORDER — KETOROLAC TROMETHAMINE 30 MG/ML
INJECTION, SOLUTION INTRAMUSCULAR; INTRAVENOUS
Status: DISCONTINUED | OUTPATIENT
Start: 2025-04-03 | End: 2025-04-03

## 2025-04-03 RX ORDER — SODIUM CHLORIDE, SODIUM LACTATE, POTASSIUM CHLORIDE, CALCIUM CHLORIDE 600; 310; 30; 20 MG/100ML; MG/100ML; MG/100ML; MG/100ML
INJECTION, SOLUTION INTRAVENOUS CONTINUOUS
Status: DISCONTINUED | OUTPATIENT
Start: 2025-04-04 | End: 2025-04-03 | Stop reason: HOSPADM

## 2025-04-03 RX ORDER — FENTANYL CITRATE 50 UG/ML
INJECTION, SOLUTION INTRAMUSCULAR; INTRAVENOUS
Status: DISCONTINUED | OUTPATIENT
Start: 2025-04-03 | End: 2025-04-03

## 2025-04-03 RX ORDER — OXYCODONE AND ACETAMINOPHEN 5; 325 MG/1; MG/1
2 TABLET ORAL ONCE
Status: DISCONTINUED | OUTPATIENT
Start: 2025-04-03 | End: 2025-04-03 | Stop reason: HOSPADM

## 2025-04-03 RX ADMIN — Medication 30 MG: at 09:04

## 2025-04-03 RX ADMIN — MIDAZOLAM HYDROCHLORIDE 2 MG: 1 INJECTION, SOLUTION INTRAMUSCULAR; INTRAVENOUS at 08:04

## 2025-04-03 RX ADMIN — ONDANSETRON 4 MG: 2 INJECTION INTRAMUSCULAR; INTRAVENOUS at 09:04

## 2025-04-03 RX ADMIN — DEXAMETHASONE SODIUM PHOSPHATE 4 MG: 4 INJECTION, SOLUTION INTRA-ARTICULAR; INTRALESIONAL; INTRAMUSCULAR; INTRAVENOUS; SOFT TISSUE at 09:04

## 2025-04-03 RX ADMIN — FENTANYL CITRATE 50 MCG: 50 INJECTION, SOLUTION INTRAMUSCULAR; INTRAVENOUS at 09:04

## 2025-04-03 RX ADMIN — PROPOFOL 200 MG: 10 INJECTION, EMULSION INTRAVENOUS at 09:04

## 2025-04-03 RX ADMIN — LIDOCAINE HYDROCHLORIDE 80 MG: 20 INJECTION, SOLUTION EPIDURAL; INFILTRATION; INTRACAUDAL; PERINEURAL at 09:04

## 2025-04-03 RX ADMIN — CEFAZOLIN 2 G: 2 INJECTION, POWDER, FOR SOLUTION INTRAMUSCULAR; INTRAVENOUS at 09:04

## 2025-04-03 RX ADMIN — HYDROMORPHONE HYDROCHLORIDE 0.5 MG: 1 INJECTION, SOLUTION INTRAMUSCULAR; INTRAVENOUS; SUBCUTANEOUS at 10:04

## 2025-04-03 RX ADMIN — KETOROLAC TROMETHAMINE 30 MG: 30 INJECTION INTRAMUSCULAR; INTRAVENOUS at 09:04

## 2025-04-03 NOTE — TRANSFER OF CARE
Anesthesia Transfer of Care Note    Patient: Davis Thomas    Procedure(s) Performed: Procedure(s) (LRB):  CLOSED REDUCTION, FINGER, WITH PINNING (Left)    Patient location: PACU    Anesthesia Type: general    Transport from OR: Transported from OR on room air with adequate spontaneous ventilation    Post pain: adequate analgesia    Post assessment: no apparent anesthetic complications and tolerated procedure well    Post vital signs: stable    Level of consciousness: sedated    Nausea/Vomiting: no nausea/vomiting    Complications: none    Transfer of care protocol was followed

## 2025-04-03 NOTE — ANESTHESIA PROCEDURE NOTES
Intubation    Date/Time: 4/3/2025 9:25 AM    Performed by: Erum Mendez CRNA  Authorized by: Zuleika Go MD    Intubation:     Induction:  Intravenous    Intubated:  Postinduction    Mask Ventilation:  Not attempted    Attempts:  1    Attempted By:  CRNA    Difficult Airway Encountered?: No      Complications:  None    Airway Device:  Supraglottic airway/LMA    Airway Device Size:  4.0    Style/Cuff Inflation:  Uncuffed    Placement Verified By:  Capnometry    Complicating Factors:  None    Findings Post-Intubation:  BS equal bilateral and atraumatic/condition of teeth unchanged

## 2025-04-03 NOTE — BRIEF OP NOTE
Orthopedic Surgery BriefOp Discharge Note    Davis Thomas  1985    [id number]      Date of Admit/Procedure: 4/3/2025    Date of Discharge: 4/3/2025    Admission Condition: good    Discharged Condition: good    Admitting Physician: Jewel Fitzpatrick MD    Discharge Physician: same    Indication for Admission: surgery    Pre-op Diagnosis: left distal phalanx fracture small finger       Post-op Diagnosis: same    Procedure(s):  Closed reduction pinning small finger distal phalanx    Anesthesia: General    Surgeon(s) and Role:  Panel 1:     * Jewel Fitzpatrick MD - Primary     * Oswaldo Conner MD - Resident: Surgeon Not Chief    Estimated Blood Loss: less than 50 mL    Quantatative Blood Loss: No Data Recorded           Drain:  none           Total IV Fluids: see anesthesia documentation           Specimens: * No specimens in log *           Implants:   Implant Name Type Inv. Item Serial No.  Lot No. LRB No. Used Action   [employer] K WIRE DOUBLE ENDED      Left 1 Implanted                Complications:  none    Findings: displaced left small finger distal phalanx fracture           Disposition: awakened from anesthesia, extubated and taken to the recovery room in a stable condition, having suffered no apparent untoward event.           Condition: doing well without problems      Technique: see full op note      Hospital Course: Patient presented to [facility] on day of scheduled surgery and underwent left small finger CRPP , please see operative report for further detail. Patient did well postoperatively and was found to be fit for discharge on POD# 0.  Their pain was controlled.  The patient met all discharge criteria.  The patient was discharged home in stable condition. Patient was given paper prescriptions.  They were given a follow-up appointment in 2 weeks in clinic for a wound check and range of motion check.  All questions and concerns of the patient were answered to their satisfaction.     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

## 2025-04-04 NOTE — ANESTHESIA POSTPROCEDURE EVALUATION
Anesthesia Post Evaluation    Patient: Davis Thomas    Procedure(s) Performed: Procedure(s) (LRB):  CLOSED REDUCTION, FINGER, WITH PINNING (Left)    Final Anesthesia Type: general      Patient location during evaluation: PACU  Patient participation: Yes- Able to Participate  Level of consciousness: awake and responds to stimulation  Post-procedure vital signs: reviewed and stable  Pain management: adequate  Airway patency: patent    PONV status at discharge: No PONV  Anesthetic complications: no      Cardiovascular status: blood pressure returned to baseline  Respiratory status: unassisted  Hydration status: euvolemic  Follow-up not needed.          Vitals Value Taken Time   /85 04/03/25 11:16   Temp 36.3 °C (97.4 °F) 04/03/25 10:35   Pulse 69 04/03/25 11:17   Resp 16 04/03/25 10:35   SpO2 99 % 04/03/25 11:17   Vitals shown include unfiled device data.      Event Time   Out of Recovery 10:35:00         Pain/Veronika Score: Pain Rating Prior to Med Admin: 6 (4/3/2025 10:26 AM)  Veronika Score: 10 (4/3/2025 10:35 AM)  Modified Veronika Score: 20 (4/3/2025 11:24 AM)

## 2025-04-16 ENCOUNTER — HOSPITAL ENCOUNTER (OUTPATIENT)
Dept: RADIOLOGY | Facility: HOSPITAL | Age: 40
Discharge: HOME | End: 2025-04-16
Attending: ORTHOPAEDIC SURGERY
Payer: MEDICAID

## 2025-04-16 ENCOUNTER — OFFICE VISIT (OUTPATIENT)
Dept: ORTHOPEDICS | Facility: CLINIC | Age: 40
End: 2025-04-16
Payer: MEDICAID

## 2025-04-16 VITALS
TEMPERATURE: 98 F | BODY MASS INDEX: 20.14 KG/M2 | DIASTOLIC BLOOD PRESSURE: 77 MMHG | HEART RATE: 65 BPM | SYSTOLIC BLOOD PRESSURE: 118 MMHG | HEIGHT: 74 IN | WEIGHT: 156.94 LBS

## 2025-04-16 DIAGNOSIS — S62.637A CLOSED DISPLACED FRACTURE OF DISTAL PHALANX OF LEFT LITTLE FINGER, INITIAL ENCOUNTER: Primary | ICD-10-CM

## 2025-04-16 DIAGNOSIS — M79.642 LEFT HAND PAIN: ICD-10-CM

## 2025-04-16 PROCEDURE — 73130 X-RAY EXAM OF HAND: CPT | Mod: TC,LT

## 2025-04-16 PROCEDURE — 99213 OFFICE O/P EST LOW 20 MIN: CPT | Mod: PBBFAC,25
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
## 2025-04-16 NOTE — LETTER
April 16, 2025      [facility]  [facility] [facility] [address]  Phone: [phone]       Date of Visit: 04/16/2025    To Whom It May Concern:    Please be advised that under state and federal laws as it relates to patient privacy and Health Insurance Accountability Act (HIPAA), we can not release our patient(s) name without authorization. Although, we can confirm that the individual listed below did accompany a person to our facility for healthcare services to be provided.    This document confirms that Belinda Thomas accompanied a patient to our facility on 04/16/2025.    Sincerely,     Debbie Tavera LPN

## 2025-05-07 ENCOUNTER — OFFICE VISIT (OUTPATIENT)
Dept: ORTHOPEDICS | Facility: CLINIC | Age: 40
End: 2025-05-07
Payer: MEDICAID

## 2025-05-07 ENCOUNTER — HOSPITAL ENCOUNTER (OUTPATIENT)
Dept: RADIOLOGY | Facility: HOSPITAL | Age: 40
Discharge: HOME | End: 2025-05-07
Attending: STUDENT IN AN ORGANIZED HEALTH CARE EDUCATION/TRAINING PROGRAM
Payer: MEDICAID

## 2025-05-07 VITALS
SYSTOLIC BLOOD PRESSURE: 102 MMHG | WEIGHT: 158.75 LBS | RESPIRATION RATE: 19 BRPM | OXYGEN SATURATION: 99 % | HEIGHT: 74 IN | BODY MASS INDEX: 20.37 KG/M2 | HEART RATE: 70 BPM | DIASTOLIC BLOOD PRESSURE: 60 MMHG | TEMPERATURE: 98 F

## 2025-05-07 DIAGNOSIS — M79.642 PAIN OF LEFT HAND: ICD-10-CM

## 2025-05-07 DIAGNOSIS — S62.637A CLOSED DISPLACED FRACTURE OF DISTAL PHALANX OF LEFT LITTLE FINGER, INITIAL ENCOUNTER: Primary | ICD-10-CM

## 2025-05-07 PROCEDURE — 73130 X-RAY EXAM OF HAND: CPT | Mod: TC,LT

## 2025-05-07 PROCEDURE — 99214 OFFICE O/P EST MOD 30 MIN: CPT | Mod: PBBFAC,25
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
## 2025-06-18 ENCOUNTER — TELEPHONE (OUTPATIENT)
Dept: OPHTHALMOLOGY | Facility: CLINIC | Age: 40
End: 2025-06-18
Payer: MEDICAID

## 2025-06-18 DIAGNOSIS — Z94.7 STATUS POST CORNEAL TRANSPLANT: ICD-10-CM

## 2025-06-18 RX ORDER — PREDNISOLONE ACETATE 10 MG/ML
1 SUSPENSION/ DROPS OPHTHALMIC DAILY
Qty: 10 ML | Refills: 5 | Status: SHIPPED | OUTPATIENT
Start: 2025-06-18

## 2025-06-18 RX ORDER — ATROPINE SULFATE 10 MG/ML
1 SOLUTION/ DROPS OPHTHALMIC 2 TIMES DAILY
Qty: 5 ML | Refills: 11 | Status: SHIPPED | OUTPATIENT
Start: 2025-06-18

## 2025-07-14 RX ORDER — ERYTHROMYCIN 5 MG/G
OINTMENT OPHTHALMIC
Qty: 3.5 G | Refills: 1 | Status: SHIPPED | OUTPATIENT
Start: 2025-07-14

## 2025-07-14 NOTE — PROGRESS NOTES
Patient's mom reached out to our clinic. Reports that Mr. Thomas had irritation in his right eye and they believed it was from the suture. Will start erythromycin ointment q2 hr and call us back in 48 hours to see if issue is resolved.

## 2025-07-16 ENCOUNTER — OFFICE VISIT (OUTPATIENT)
Dept: ORTHOPEDICS | Facility: CLINIC | Age: 40
End: 2025-07-16
Payer: MEDICAID

## 2025-07-16 ENCOUNTER — HOSPITAL ENCOUNTER (OUTPATIENT)
Dept: RADIOLOGY | Facility: HOSPITAL | Age: 40
Discharge: HOME | End: 2025-07-16
Attending: STUDENT IN AN ORGANIZED HEALTH CARE EDUCATION/TRAINING PROGRAM
Payer: MEDICAID

## 2025-07-16 VITALS
TEMPERATURE: 99 F | HEIGHT: 74 IN | BODY MASS INDEX: 20.02 KG/M2 | DIASTOLIC BLOOD PRESSURE: 79 MMHG | SYSTOLIC BLOOD PRESSURE: 135 MMHG | WEIGHT: 156 LBS

## 2025-07-16 DIAGNOSIS — S62.637A CLOSED DISPLACED FRACTURE OF DISTAL PHALANX OF LEFT LITTLE FINGER, INITIAL ENCOUNTER: ICD-10-CM

## 2025-07-16 DIAGNOSIS — S62.637A CLOSED DISPLACED FRACTURE OF DISTAL PHALANX OF LEFT LITTLE FINGER, INITIAL ENCOUNTER: Primary | ICD-10-CM

## 2025-07-16 PROCEDURE — 73140 X-RAY EXAM OF FINGER(S): CPT | Mod: TC,LT

## 2025-07-16 PROCEDURE — 99213 OFFICE O/P EST LOW 20 MIN: CPT | Mod: PBBFAC,25

## 2025-07-16 NOTE — PROGRESS NOTES
Faculty Attestation: Davis Thomas  was seen at [facility] in the Orthopaedic Clinic. Discussed with the resident at the time of the visit. History of Present Illness, Physical Exam, and Assessment and Plan reviewed. Treatment plan is reasonable and appropriate. Compliance with treatment recommendations is important. No procedure was performed.     Nicho Gamboa MD  Orthopaedic Surgery

## 2025-07-16 NOTE — PROGRESS NOTES
[facility] Orthopaedic Surgery Clinic Progress Note     In brief, 40 y.o. male s/p CRPP left little finger 04/03/2025       HPI:  40-year-old right-hand dominant male who sustained a left little finger base of 5th distal phalanx fracture.  He underwent CRPP 3 months ago.  He is overall doing well.  He is never able to obtain occupational therapy due to insurance issues.  He has no pain and no signs of infection    PMH:   Past Medical History:   Diagnosis Date    Keratoconus        PSH:   Past Surgical History:   Procedure Laterality Date    CLOSED REDUCTION, FINGER, WITH PINNING Left 4/3/2025    Procedure: CLOSED REDUCTION, FINGER, WITH PINNING;  Surgeon: Jewel Fitzpatrick MD;  Location: [facility];  Service: Orthopedics;  Laterality: Left;    CORNEAL TRANSPLANT Bilateral     REPAIR OF OPEN GLOBE Left 4/11/2024    Procedure: REPAIR, OPEN GLOBE;  Surgeon: Eric Shen MD;  Location: [facility];  Service: Ophthalmology;  Laterality: Left;  repair of ruptured globe and corneal transplant tissue to left eye       SH: Social History[1]    FH:   Family History   Problem Relation Name Age of Onset    Hypertension Mother         Allergies: Review of patient's allergies indicates:  No Known Allergies     Physical Exam:    Vitals:    07/16/25 1220   BP: 135/79   Temp: 98.6 °F (37 °C)       General: NAD  Cardio: Regular rate by peripheral pulse   Pulm: Normal WOB on room air, symmetric chest expansion  Abd: Soft, NT/ND  Psych: normal affect/mood  Neuro: A&O    Left hand:   Well healed surgical wounds  No swelling  No TTP  MCP J and PIPJ range of motion past 90° flexion  DIPJ 0-45 degrees  Able to easily passively touch finger tip to palm  1 cm short of active flexion to palm  Hand perfused    Imaging:  XR left little finger:  Dorsal osteophyte over the DIPJ.  Finger reduced.  No fractures noted     Assessment/Plan:  In brief, 40 y.o. male s/p CRPP left little finger 04/03/2025  He has relatively good passive range of motion today on  exam.  I demonstrated flexion stretches for him to perform.  We will do this instead of doing formal therapy  Weightbearing as tolerated  Follow up in 2 months if patient is not able opposed little fingertip to palm      Mykel Pinto MD  [employer] Orthopaedic Surgery, PGY-5  7/16/2025 12:42 PM         [1]   Social History  Socioeconomic History    Marital status: Unknown   Tobacco Use    Smoking status: Never     Passive exposure: Never    Smokeless tobacco: Never   Substance and Sexual Activity    Alcohol use: Yes     Comment: beer on occ    Drug use: Not Currently     Types: Marijuana    Sexual activity: Yes     Partners: Female     Social Drivers of Health     Food Insecurity: No Food Insecurity (7/31/2024)    Hunger Vital Sign     Worried About Running Out of Food in the Last Year: Never true     Ran Out of Food in the Last Year: Never true   Transportation Needs: No Transportation Needs (9/7/2023)    PRAPARE - Transportation     Lack of Transportation (Medical): No     Lack of Transportation (Non-Medical): No   Physical Activity: Inactive (7/31/2024)    Exercise Vital Sign     Days of Exercise per Week: 0 days     Minutes of Exercise per Session: 0 min   Stress: No Stress Concern Present (7/31/2024)    [place] [place] [employer] - Occupational Stress Questionnaire     Feeling of Stress : Not at all   Housing Stability: Low Risk  (9/7/2023)    Housing Stability Vital Sign     Unable to Pay for Housing in the Last Year: No     Number of Places Lived in the Last Year: 1     Unstable Housing in the Last Year: No

## 2025-07-30 ENCOUNTER — LAB VISIT (OUTPATIENT)
Dept: LAB | Facility: HOSPITAL | Age: 40
End: 2025-07-30
Payer: MEDICAID

## 2025-07-30 DIAGNOSIS — Z13.1 SCREENING FOR DIABETES MELLITUS: ICD-10-CM

## 2025-07-30 DIAGNOSIS — Z00.00 WELL ADULT EXAM: ICD-10-CM

## 2025-07-30 DIAGNOSIS — Z12.5 SCREENING FOR PROSTATE CANCER: ICD-10-CM

## 2025-07-30 DIAGNOSIS — Z13.21 ENCOUNTER FOR VITAMIN DEFICIENCY SCREENING: ICD-10-CM

## 2025-07-30 DIAGNOSIS — Z13.0 SCREENING FOR IRON DEFICIENCY ANEMIA: ICD-10-CM

## 2025-07-30 DIAGNOSIS — Z13.29 SCREENING FOR THYROID DISORDER: ICD-10-CM

## 2025-07-30 DIAGNOSIS — Z13.220 SCREENING FOR LIPID DISORDERS: ICD-10-CM

## 2025-07-30 LAB
25(OH)D3+25(OH)D2 SERPL-MCNC: 42 NG/ML (ref 30–80)
ALBUMIN SERPL-MCNC: 4.2 G/DL (ref 3.5–5)
ALBUMIN/GLOB SERPL: 1.2 RATIO (ref 1.1–2)
ALP SERPL-CCNC: 92 UNIT/L (ref 40–150)
ALT SERPL-CCNC: 17 UNIT/L (ref 0–55)
ANION GAP SERPL CALC-SCNC: 6 MEQ/L
AST SERPL-CCNC: 20 UNIT/L (ref 11–45)
BASOPHILS # BLD AUTO: 0.05 X10(3)/MCL
BASOPHILS NFR BLD AUTO: 1 %
BILIRUB SERPL-MCNC: 1.3 MG/DL
BUN SERPL-MCNC: 5.5 MG/DL (ref 8.9–20.6)
CALCIUM SERPL-MCNC: 9.4 MG/DL (ref 8.4–10.2)
CHLORIDE SERPL-SCNC: 105 MMOL/L (ref 98–107)
CHOLEST SERPL-MCNC: 132 MG/DL
CHOLEST/HDLC SERPL: 2 {RATIO} (ref 0–5)
CO2 SERPL-SCNC: 28 MMOL/L (ref 22–29)
CREAT SERPL-MCNC: 0.87 MG/DL (ref 0.72–1.25)
CREAT/UREA NIT SERPL: 6
EOSINOPHIL # BLD AUTO: 0.33 X10(3)/MCL (ref 0–0.9)
EOSINOPHIL NFR BLD AUTO: 6.6 %
ERYTHROCYTE [DISTWIDTH] IN BLOOD BY AUTOMATED COUNT: 11.6 % (ref 11.5–17)
EST. AVERAGE GLUCOSE BLD GHB EST-MCNC: 82.5 MG/DL
FERRITIN SERPL-MCNC: 212.1 NG/ML (ref 21.81–274.66)
GFR SERPLBLD CREATININE-BSD FMLA CKD-EPI: >60 ML/MIN/1.73/M2
GLOBULIN SER-MCNC: 3.6 GM/DL (ref 2.4–3.5)
GLUCOSE SERPL-MCNC: 101 MG/DL (ref 74–100)
HBA1C MFR BLD: 4.5 %
HCT VFR BLD AUTO: 43.9 % (ref 42–52)
HDLC SERPL-MCNC: 75 MG/DL (ref 35–60)
HGB BLD-MCNC: 14.5 G/DL (ref 14–18)
IMM GRANULOCYTES # BLD AUTO: 0.01 X10(3)/MCL (ref 0–0.04)
IMM GRANULOCYTES NFR BLD AUTO: 0.2 %
IRON SATN MFR SERPL: 49 % (ref 20–50)
IRON SERPL-MCNC: 125 UG/DL (ref 65–175)
LDLC SERPL CALC-MCNC: 45 MG/DL (ref 50–140)
LYMPHOCYTES # BLD AUTO: 1.76 X10(3)/MCL (ref 0.6–4.6)
LYMPHOCYTES NFR BLD AUTO: 35.4 %
MCH RBC QN AUTO: 30.5 PG (ref 27–31)
MCHC RBC AUTO-ENTMCNC: 33 G/DL (ref 33–36)
MCV RBC AUTO: 92.2 FL (ref 80–94)
MONOCYTES # BLD AUTO: 0.43 X10(3)/MCL (ref 0.1–1.3)
MONOCYTES NFR BLD AUTO: 8.7 %
NEUTROPHILS # BLD AUTO: 2.39 X10(3)/MCL (ref 2.1–9.2)
NEUTROPHILS NFR BLD AUTO: 48.1 %
NRBC BLD AUTO-RTO: 0 %
PLATELET # BLD AUTO: 144 X10(3)/MCL (ref 130–400)
PMV BLD AUTO: 11.7 FL (ref 7.4–10.4)
POTASSIUM SERPL-SCNC: 3.5 MMOL/L (ref 3.5–5.1)
PROT SERPL-MCNC: 7.8 GM/DL (ref 6.4–8.3)
PSA SERPL-MCNC: 1.18 NG/ML
RBC # BLD AUTO: 4.76 X10(6)/MCL (ref 4.7–6.1)
SODIUM SERPL-SCNC: 139 MMOL/L (ref 136–145)
T4 FREE SERPL-MCNC: 0.98 NG/DL (ref 0.7–1.48)
TIBC SERPL-MCNC: 132 UG/DL (ref 60–240)
TIBC SERPL-MCNC: 257 UG/DL (ref 250–450)
TRANSFERRIN SERPL-MCNC: 212 MG/DL (ref 174–364)
TRIGL SERPL-MCNC: 60 MG/DL (ref 34–140)
TSH SERPL-ACNC: 1.56 UIU/ML (ref 0.35–4.94)
VLDLC SERPL CALC-MCNC: 12 MG/DL
WBC # BLD AUTO: 4.97 X10(3)/MCL (ref 4.5–11.5)

## 2025-07-30 PROCEDURE — 82306 VITAMIN D 25 HYDROXY: CPT

## 2025-07-30 PROCEDURE — 84153 ASSAY OF PSA TOTAL: CPT

## 2025-07-30 PROCEDURE — 83036 HEMOGLOBIN GLYCOSYLATED A1C: CPT

## 2025-07-30 PROCEDURE — 84443 ASSAY THYROID STIM HORMONE: CPT

## 2025-07-30 PROCEDURE — 80061 LIPID PANEL: CPT

## 2025-07-30 PROCEDURE — 82728 ASSAY OF FERRITIN: CPT

## 2025-07-30 PROCEDURE — 84439 ASSAY OF FREE THYROXINE: CPT

## 2025-07-30 PROCEDURE — 83550 IRON BINDING TEST: CPT

## 2025-07-30 PROCEDURE — 80053 COMPREHEN METABOLIC PANEL: CPT

## 2025-07-30 PROCEDURE — 36415 COLL VENOUS BLD VENIPUNCTURE: CPT

## 2025-07-30 PROCEDURE — 85025 COMPLETE CBC W/AUTO DIFF WBC: CPT

## 2025-07-31 ENCOUNTER — OFFICE VISIT (OUTPATIENT)
Dept: INTERNAL MEDICINE | Facility: CLINIC | Age: 40
End: 2025-07-31
Payer: MEDICAID

## 2025-07-31 VITALS
HEART RATE: 56 BPM | OXYGEN SATURATION: 99 % | BODY MASS INDEX: 19.43 KG/M2 | TEMPERATURE: 98 F | WEIGHT: 151.38 LBS | RESPIRATION RATE: 17 BRPM | SYSTOLIC BLOOD PRESSURE: 133 MMHG | DIASTOLIC BLOOD PRESSURE: 81 MMHG | HEIGHT: 74 IN

## 2025-07-31 DIAGNOSIS — Q87.40 MARFAN'S SYNDROME: Chronic | ICD-10-CM

## 2025-07-31 DIAGNOSIS — Z13.220 SCREENING FOR LIPID DISORDERS: ICD-10-CM

## 2025-07-31 DIAGNOSIS — Z00.00 WELL ADULT EXAM: Primary | ICD-10-CM

## 2025-07-31 DIAGNOSIS — Z13.1 SCREENING FOR DIABETES MELLITUS: ICD-10-CM

## 2025-07-31 DIAGNOSIS — M21.619 BUNION: ICD-10-CM

## 2025-07-31 DIAGNOSIS — Z13.21 ENCOUNTER FOR VITAMIN DEFICIENCY SCREENING: ICD-10-CM

## 2025-07-31 DIAGNOSIS — S62.637S: ICD-10-CM

## 2025-07-31 DIAGNOSIS — Z12.5 SCREENING FOR PROSTATE CANCER: ICD-10-CM

## 2025-07-31 PROCEDURE — 99215 OFFICE O/P EST HI 40 MIN: CPT | Mod: PBBFAC
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
## 2025-07-31 NOTE — ASSESSMENT & PLAN NOTE
ED precautions for:  Go to the ER or call your doctor if you experience:  Sudden chest, back, or abdominal pain  Shortness of breath  Blurred vision or eye pain  Symptoms of a stroke (e.g., slurred speech, numbness, weakness)

## 2025-07-31 NOTE — ASSESSMENT & PLAN NOTE
Wellness labs - 7/30/2025.  Prostate Cancer Screening - Last PSA - 1.18 on 7/30/2025. Follow up annually.   Colon Cancer Screening - Deferred due to age.   Osteoporosis Screening - Deferred due to age.   Eye Exam - Followed by [facility] ophthalmology.  Dental Exam - Several years. List of local dentists given to patient.   Vaccinations: Flu - Not currently being offered, recommended annually. / Tetanus - 4/11/2024

## 2025-07-31 NOTE — ASSESSMENT & PLAN NOTE
Managed by: [facility] Orthopedic clinic for left little finger fracture. Last office visit on 7/16/2025. CRPP  on 4/03/2025.  Reports improvement.

## 2025-07-31 NOTE — ASSESSMENT & PLAN NOTE
Patient continues to experience foot issues.  Referred to Dr. Kevin Odell at [facility] for podiatric evaluation and treatment.

## 2025-07-31 NOTE — PATIENT INSTRUCTIONS
REMINDER: Please complete labs within 1 week of appointment.   Please complete satisfaction survey when received. Thank you.      Archie Cannon,     If you are due for any health screening(s) below please notify me so we can arrange them to be ordered and scheduled. Most healthy patients at your age complete them, but you are free to accept or refuse.     If you can't do it, I'll definitely understand. If you can, I'd certainly appreciate it!    All of your core healthy metrics are met.

## (undated) DEVICE — SYR 10CC LUER LOCK

## (undated) DEVICE — KNIFE OPTIMUM STR GRN 15DEG

## (undated) DEVICE — BANDAGE SWIFTWRAP ELAS 4INX5YD

## (undated) DEVICE — GAUZE SPONGE 4X4 12PLY

## (undated) DEVICE — DRESSING EYE OVAL LF

## (undated) DEVICE — COVER PROXIMA MAYO STAND

## (undated) DEVICE — KIT SURGICAL TURNOVER

## (undated) DEVICE — COVER KWIRE AND PIN WHITE .045

## (undated) DEVICE — DRESSING GAUZE XEROFORM 5X9

## (undated) DEVICE — NDL HYPO A BEVEL 30X1/2

## (undated) DEVICE — DRAPE OPTHALMIC W/POUCH

## (undated) DEVICE — SPONGE GAUZE 16PLY 4X4

## (undated) DEVICE — SOL NACL IRR 1000ML BTL

## (undated) DEVICE — SPONGE WEC CEL SPEARS

## (undated) DEVICE — SYR LUER LOCK 1CC

## (undated) DEVICE — KIT BASIC ORTHO UNIVERSITY

## (undated) DEVICE — HANDLE DEVON RIGID OR LIGHT

## (undated) DEVICE — SUT 7/0 18IN COATED VICRYL

## (undated) DEVICE — SUT 6/0 18IN PLAIN GUT D/A

## (undated) DEVICE — APPLICATOR CHLORAPREP ORN 26ML

## (undated) DEVICE — GOWN X-LG STERILE BACK

## (undated) DEVICE — BANDAGE ESMARK ELASTIC ST 4X9

## (undated) DEVICE — DRAPE C-ARM COVER EZ 36X28IN

## (undated) DEVICE — GOWN POLY REINF BRTH SLV XL

## (undated) DEVICE — NDL HYPO REG 25G X 1 1/2

## (undated) DEVICE — CORD BIPOLAR 12 FOOT

## (undated) DEVICE — GLOVE SENSICARE PI MICRO 8

## (undated) DEVICE — DRAPE HAND STERILE

## (undated) DEVICE — BLADE SURG STAINLESS STEEL #15

## (undated) DEVICE — TOWEL OR DISP STRL BLUE 4/PK

## (undated) DEVICE — ELECTRODE PATIENT RETURN DISP

## (undated) DEVICE — SUT ETHILON 10-0 CS175-8 12IN

## (undated) DEVICE — CATH IV CATHLON W/HUB18GAX

## (undated) DEVICE — Device

## (undated) DEVICE — PAD EYE OVAL CNTOUR 1.62X2.62

## (undated) DEVICE — MANIFOLD 4 PORT

## (undated) DEVICE — TOURNIQUET SB QC DP 18X4IN

## (undated) DEVICE — SUPPORT ULNA NERVE PROTECTOR

## (undated) DEVICE — SPONGE GAUZE 4X4 12 PLY STRL

## (undated) DEVICE — SHIELD EYE PLASTIC 3100G